# Patient Record
Sex: FEMALE | Race: ASIAN | NOT HISPANIC OR LATINO | URBAN - METROPOLITAN AREA
[De-identification: names, ages, dates, MRNs, and addresses within clinical notes are randomized per-mention and may not be internally consistent; named-entity substitution may affect disease eponyms.]

---

## 2023-08-22 ENCOUNTER — INPATIENT (INPATIENT)
Facility: HOSPITAL | Age: 68
LOS: 1 days | Discharge: HOME | DRG: 86 | End: 2023-08-24
Attending: SURGERY | Admitting: SURGERY
Payer: MEDICAID

## 2023-08-22 ENCOUNTER — EMERGENCY (EMERGENCY)
Facility: HOSPITAL | Age: 68
LOS: 1 days | Discharge: ANOTHER HOSPITAL | End: 2023-08-22
Attending: EMERGENCY MEDICINE
Payer: SELF-PAY

## 2023-08-22 VITALS
HEIGHT: 61 IN | RESPIRATION RATE: 20 BRPM | WEIGHT: 141.98 LBS | HEART RATE: 73 BPM | OXYGEN SATURATION: 98 % | DIASTOLIC BLOOD PRESSURE: 101 MMHG | SYSTOLIC BLOOD PRESSURE: 165 MMHG | TEMPERATURE: 98 F

## 2023-08-22 VITALS
RESPIRATION RATE: 20 BRPM | SYSTOLIC BLOOD PRESSURE: 142 MMHG | DIASTOLIC BLOOD PRESSURE: 84 MMHG | WEIGHT: 141.98 LBS | HEART RATE: 71 BPM | HEIGHT: 61 IN | OXYGEN SATURATION: 94 % | TEMPERATURE: 98 F

## 2023-08-22 VITALS
DIASTOLIC BLOOD PRESSURE: 92 MMHG | HEART RATE: 70 BPM | TEMPERATURE: 98 F | RESPIRATION RATE: 18 BRPM | OXYGEN SATURATION: 100 % | SYSTOLIC BLOOD PRESSURE: 139 MMHG

## 2023-08-22 DIAGNOSIS — I60.9 NONTRAUMATIC SUBARACHNOID HEMORRHAGE, UNSPECIFIED: ICD-10-CM

## 2023-08-22 LAB
ALBUMIN SERPL ELPH-MCNC: 3.9 G/DL — SIGNIFICANT CHANGE UP (ref 3.5–5)
ALBUMIN SERPL ELPH-MCNC: 4.7 G/DL — SIGNIFICANT CHANGE UP (ref 3.3–5)
ALP SERPL-CCNC: 74 U/L — SIGNIFICANT CHANGE UP (ref 40–120)
ALP SERPL-CCNC: 74 U/L — SIGNIFICANT CHANGE UP (ref 40–120)
ALT FLD-CCNC: 26 U/L — SIGNIFICANT CHANGE UP (ref 10–45)
ALT FLD-CCNC: 42 U/L DA — SIGNIFICANT CHANGE UP (ref 10–60)
ANION GAP SERPL CALC-SCNC: 12 MMOL/L — SIGNIFICANT CHANGE UP (ref 5–17)
ANION GAP SERPL CALC-SCNC: 6 MMOL/L — SIGNIFICANT CHANGE UP (ref 5–17)
APTT BLD: 20.3 SEC — LOW (ref 24.5–35.6)
APTT BLD: 32.6 SEC — SIGNIFICANT CHANGE UP (ref 24.5–35.6)
APTT BLD: 36.1 SEC — HIGH (ref 24.5–35.6)
AST SERPL-CCNC: 43 U/L — HIGH (ref 10–40)
AST SERPL-CCNC: 52 U/L — HIGH (ref 10–40)
BASOPHILS # BLD AUTO: 0.09 K/UL — SIGNIFICANT CHANGE UP (ref 0–0.2)
BASOPHILS # BLD AUTO: 0.13 K/UL — SIGNIFICANT CHANGE UP (ref 0–0.2)
BASOPHILS NFR BLD AUTO: 0.5 % — SIGNIFICANT CHANGE UP (ref 0–2)
BASOPHILS NFR BLD AUTO: 0.7 % — SIGNIFICANT CHANGE UP (ref 0–2)
BILIRUB SERPL-MCNC: 0.8 MG/DL — SIGNIFICANT CHANGE UP (ref 0.2–1.2)
BILIRUB SERPL-MCNC: 0.9 MG/DL — SIGNIFICANT CHANGE UP (ref 0.2–1.2)
BLD GP AB SCN SERPL QL: NEGATIVE — SIGNIFICANT CHANGE UP
BUN SERPL-MCNC: 27 MG/DL — HIGH (ref 7–23)
BUN SERPL-MCNC: 28 MG/DL — HIGH (ref 7–18)
CALCIUM SERPL-MCNC: 9.4 MG/DL — SIGNIFICANT CHANGE UP (ref 8.4–10.5)
CALCIUM SERPL-MCNC: 9.8 MG/DL — SIGNIFICANT CHANGE UP (ref 8.4–10.5)
CHLORIDE SERPL-SCNC: 104 MMOL/L — SIGNIFICANT CHANGE UP (ref 96–108)
CHLORIDE SERPL-SCNC: 108 MMOL/L — SIGNIFICANT CHANGE UP (ref 96–108)
CO2 SERPL-SCNC: 20 MMOL/L — LOW (ref 22–31)
CO2 SERPL-SCNC: 21 MMOL/L — LOW (ref 22–31)
CREAT SERPL-MCNC: 1.59 MG/DL — HIGH (ref 0.5–1.3)
CREAT SERPL-MCNC: 1.81 MG/DL — HIGH (ref 0.5–1.3)
EGFR: 30 ML/MIN/1.73M2 — LOW
EGFR: 36 ML/MIN/1.73M2 — LOW
EOSINOPHIL # BLD AUTO: 0.09 K/UL — SIGNIFICANT CHANGE UP (ref 0–0.5)
EOSINOPHIL # BLD AUTO: 0.2 K/UL — SIGNIFICANT CHANGE UP (ref 0–0.5)
EOSINOPHIL NFR BLD AUTO: 0.5 % — SIGNIFICANT CHANGE UP (ref 0–6)
EOSINOPHIL NFR BLD AUTO: 1.1 % — SIGNIFICANT CHANGE UP (ref 0–6)
GLUCOSE SERPL-MCNC: 118 MG/DL — HIGH (ref 70–99)
GLUCOSE SERPL-MCNC: 127 MG/DL — HIGH (ref 70–99)
HCT VFR BLD CALC: 28.8 % — LOW (ref 34.5–45)
HCT VFR BLD CALC: 29.1 % — LOW (ref 34.5–45)
HGB BLD-MCNC: 10 G/DL — LOW (ref 11.5–15.5)
HGB BLD-MCNC: 9.9 G/DL — LOW (ref 11.5–15.5)
IMM GRANULOCYTES NFR BLD AUTO: 0.9 % — SIGNIFICANT CHANGE UP (ref 0–0.9)
IMM GRANULOCYTES NFR BLD AUTO: 1 % — HIGH (ref 0–0.9)
INR BLD: 1 RATIO — SIGNIFICANT CHANGE UP (ref 0.85–1.18)
INR BLD: 1.06 RATIO — SIGNIFICANT CHANGE UP (ref 0.85–1.18)
LYMPHOCYTES # BLD AUTO: 1.32 K/UL — SIGNIFICANT CHANGE UP (ref 1–3.3)
LYMPHOCYTES # BLD AUTO: 1.6 K/UL — SIGNIFICANT CHANGE UP (ref 1–3.3)
LYMPHOCYTES # BLD AUTO: 7.6 % — LOW (ref 13–44)
LYMPHOCYTES # BLD AUTO: 8.7 % — LOW (ref 13–44)
MCHC RBC-ENTMCNC: 28.9 PG — SIGNIFICANT CHANGE UP (ref 27–34)
MCHC RBC-ENTMCNC: 29.2 PG — SIGNIFICANT CHANGE UP (ref 27–34)
MCHC RBC-ENTMCNC: 34 GM/DL — SIGNIFICANT CHANGE UP (ref 32–36)
MCHC RBC-ENTMCNC: 34.7 GM/DL — SIGNIFICANT CHANGE UP (ref 32–36)
MCV RBC AUTO: 84.2 FL — SIGNIFICANT CHANGE UP (ref 80–100)
MCV RBC AUTO: 84.8 FL — SIGNIFICANT CHANGE UP (ref 80–100)
MONOCYTES # BLD AUTO: 0.71 K/UL — SIGNIFICANT CHANGE UP (ref 0–0.9)
MONOCYTES # BLD AUTO: 0.72 K/UL — SIGNIFICANT CHANGE UP (ref 0–0.9)
MONOCYTES NFR BLD AUTO: 3.9 % — SIGNIFICANT CHANGE UP (ref 2–14)
MONOCYTES NFR BLD AUTO: 4.1 % — SIGNIFICANT CHANGE UP (ref 2–14)
NEUTROPHILS # BLD AUTO: 15.02 K/UL — HIGH (ref 1.8–7.4)
NEUTROPHILS # BLD AUTO: 15.55 K/UL — HIGH (ref 1.8–7.4)
NEUTROPHILS NFR BLD AUTO: 84.7 % — HIGH (ref 43–77)
NEUTROPHILS NFR BLD AUTO: 86.3 % — HIGH (ref 43–77)
NRBC # BLD: 0 /100 WBCS — SIGNIFICANT CHANGE UP (ref 0–0)
NRBC # BLD: 0 /100 WBCS — SIGNIFICANT CHANGE UP (ref 0–0)
PLATELET # BLD AUTO: 214 K/UL — SIGNIFICANT CHANGE UP (ref 150–400)
PLATELET # BLD AUTO: 221 K/UL — SIGNIFICANT CHANGE UP (ref 150–400)
POTASSIUM SERPL-MCNC: 4 MMOL/L — SIGNIFICANT CHANGE UP (ref 3.5–5.3)
POTASSIUM SERPL-MCNC: 4.3 MMOL/L — SIGNIFICANT CHANGE UP (ref 3.5–5.3)
POTASSIUM SERPL-SCNC: 4 MMOL/L — SIGNIFICANT CHANGE UP (ref 3.5–5.3)
POTASSIUM SERPL-SCNC: 4.3 MMOL/L — SIGNIFICANT CHANGE UP (ref 3.5–5.3)
PROT SERPL-MCNC: 8.3 G/DL — SIGNIFICANT CHANGE UP (ref 6–8.3)
PROT SERPL-MCNC: 8.4 G/DL — HIGH (ref 6–8.3)
PROTHROM AB SERPL-ACNC: 11.1 SEC — SIGNIFICANT CHANGE UP (ref 9.5–13)
PROTHROM AB SERPL-ACNC: 11.4 SEC — SIGNIFICANT CHANGE UP (ref 9.5–13)
RBC # BLD: 3.42 M/UL — LOW (ref 3.8–5.2)
RBC # BLD: 3.43 M/UL — LOW (ref 3.8–5.2)
RBC # FLD: 16.5 % — HIGH (ref 10.3–14.5)
RBC # FLD: 16.6 % — HIGH (ref 10.3–14.5)
RH IG SCN BLD-IMP: POSITIVE — SIGNIFICANT CHANGE UP
SARS-COV-2 RNA SPEC QL NAA+PROBE: SIGNIFICANT CHANGE UP
SODIUM SERPL-SCNC: 135 MMOL/L — SIGNIFICANT CHANGE UP (ref 135–145)
SODIUM SERPL-SCNC: 136 MMOL/L — SIGNIFICANT CHANGE UP (ref 135–145)
WBC # BLD: 17.42 K/UL — HIGH (ref 3.8–10.5)
WBC # BLD: 18.36 K/UL — HIGH (ref 3.8–10.5)
WBC # FLD AUTO: 17.42 K/UL — HIGH (ref 3.8–10.5)
WBC # FLD AUTO: 18.36 K/UL — HIGH (ref 3.8–10.5)

## 2023-08-22 PROCEDURE — 85025 COMPLETE CBC W/AUTO DIFF WBC: CPT

## 2023-08-22 PROCEDURE — 85610 PROTHROMBIN TIME: CPT

## 2023-08-22 PROCEDURE — 70450 CT HEAD/BRAIN W/O DYE: CPT | Mod: MA

## 2023-08-22 PROCEDURE — 86901 BLOOD TYPING SEROLOGIC RH(D): CPT

## 2023-08-22 PROCEDURE — 99291 CRITICAL CARE FIRST HOUR: CPT

## 2023-08-22 PROCEDURE — 72125 CT NECK SPINE W/O DYE: CPT | Mod: MA

## 2023-08-22 PROCEDURE — 70450 CT HEAD/BRAIN W/O DYE: CPT | Mod: 26,MA

## 2023-08-22 PROCEDURE — 99291 CRITICAL CARE FIRST HOUR: CPT | Mod: 25

## 2023-08-22 PROCEDURE — 87635 SARS-COV-2 COVID-19 AMP PRB: CPT

## 2023-08-22 PROCEDURE — 73030 X-RAY EXAM OF SHOULDER: CPT

## 2023-08-22 PROCEDURE — 70486 CT MAXILLOFACIAL W/O DYE: CPT | Mod: MA

## 2023-08-22 PROCEDURE — 70486 CT MAXILLOFACIAL W/O DYE: CPT | Mod: 26,MA

## 2023-08-22 PROCEDURE — 72125 CT NECK SPINE W/O DYE: CPT | Mod: 26,MA

## 2023-08-22 PROCEDURE — 86850 RBC ANTIBODY SCREEN: CPT

## 2023-08-22 PROCEDURE — 73020 X-RAY EXAM OF SHOULDER: CPT | Mod: 26,59,RT

## 2023-08-22 PROCEDURE — 73080 X-RAY EXAM OF ELBOW: CPT | Mod: 26,RT

## 2023-08-22 PROCEDURE — 73060 X-RAY EXAM OF HUMERUS: CPT | Mod: 26,RT

## 2023-08-22 PROCEDURE — 73030 X-RAY EXAM OF SHOULDER: CPT | Mod: 26,RT

## 2023-08-22 PROCEDURE — 71045 X-RAY EXAM CHEST 1 VIEW: CPT | Mod: 26

## 2023-08-22 PROCEDURE — 86900 BLOOD TYPING SEROLOGIC ABO: CPT

## 2023-08-22 PROCEDURE — 85730 THROMBOPLASTIN TIME PARTIAL: CPT

## 2023-08-22 PROCEDURE — 80053 COMPREHEN METABOLIC PANEL: CPT

## 2023-08-22 PROCEDURE — 70450 CT HEAD/BRAIN W/O DYE: CPT | Mod: 26,MA,77

## 2023-08-22 PROCEDURE — 36415 COLL VENOUS BLD VENIPUNCTURE: CPT

## 2023-08-22 RX ORDER — OXYCODONE HYDROCHLORIDE 5 MG/1
5 TABLET ORAL EVERY 4 HOURS
Refills: 0 | Status: DISCONTINUED | OUTPATIENT
Start: 2023-08-22 | End: 2023-08-24

## 2023-08-22 RX ORDER — LEVETIRACETAM 250 MG/1
500 TABLET, FILM COATED ORAL ONCE
Refills: 0 | Status: COMPLETED | OUTPATIENT
Start: 2023-08-22 | End: 2023-08-22

## 2023-08-22 RX ORDER — OXYCODONE HYDROCHLORIDE 5 MG/1
2.5 TABLET ORAL EVERY 4 HOURS
Refills: 0 | Status: DISCONTINUED | OUTPATIENT
Start: 2023-08-22 | End: 2023-08-24

## 2023-08-22 RX ORDER — SODIUM CHLORIDE 9 MG/ML
1000 INJECTION, SOLUTION INTRAVENOUS
Refills: 0 | Status: DISCONTINUED | OUTPATIENT
Start: 2023-08-22 | End: 2023-08-22

## 2023-08-22 RX ORDER — ACETAMINOPHEN 500 MG
975 TABLET ORAL ONCE
Refills: 0 | Status: COMPLETED | OUTPATIENT
Start: 2023-08-22 | End: 2023-08-22

## 2023-08-22 RX ORDER — DESMOPRESSIN ACETATE 0.1 MG/1
20 TABLET ORAL ONCE
Refills: 0 | Status: COMPLETED | OUTPATIENT
Start: 2023-08-22 | End: 2023-08-22

## 2023-08-22 RX ORDER — LABETALOL HCL 100 MG
10 TABLET ORAL ONCE
Refills: 0 | Status: COMPLETED | OUTPATIENT
Start: 2023-08-22 | End: 2023-08-22

## 2023-08-22 RX ORDER — ACETAMINOPHEN 500 MG
975 TABLET ORAL EVERY 6 HOURS
Refills: 0 | Status: DISCONTINUED | OUTPATIENT
Start: 2023-08-22 | End: 2023-08-24

## 2023-08-22 RX ORDER — SODIUM CHLORIDE 9 MG/ML
250 INJECTION INTRAMUSCULAR; INTRAVENOUS; SUBCUTANEOUS ONCE
Refills: 0 | Status: COMPLETED | OUTPATIENT
Start: 2023-08-22 | End: 2023-08-22

## 2023-08-22 RX ORDER — LEVETIRACETAM 250 MG/1
500 TABLET, FILM COATED ORAL
Refills: 0 | Status: DISCONTINUED | OUTPATIENT
Start: 2023-08-23 | End: 2023-08-24

## 2023-08-22 RX ADMIN — DESMOPRESSIN ACETATE 20 MICROGRAM(S): 0.1 TABLET ORAL at 20:15

## 2023-08-22 RX ADMIN — DESMOPRESSIN ACETATE 220 MICROGRAM(S): 0.1 TABLET ORAL at 19:59

## 2023-08-22 RX ADMIN — Medication 10 MILLIGRAM(S): at 16:32

## 2023-08-22 RX ADMIN — LEVETIRACETAM 500 MILLIGRAM(S): 250 TABLET, FILM COATED ORAL at 20:15

## 2023-08-22 RX ADMIN — SODIUM CHLORIDE 250 MILLILITER(S): 9 INJECTION INTRAMUSCULAR; INTRAVENOUS; SUBCUTANEOUS at 19:59

## 2023-08-22 RX ADMIN — Medication 975 MILLIGRAM(S): at 14:58

## 2023-08-22 RX ADMIN — SODIUM CHLORIDE 250 MILLILITER(S): 9 INJECTION INTRAMUSCULAR; INTRAVENOUS; SUBCUTANEOUS at 21:00

## 2023-08-22 RX ADMIN — LEVETIRACETAM 400 MILLIGRAM(S): 250 TABLET, FILM COATED ORAL at 19:59

## 2023-08-22 NOTE — ED PROVIDER NOTE - CLINICAL SUMMARY MEDICAL DECISION MAKING FREE TEXT BOX
67-year-old female with history of hypertension presenting with fall.  Patient reports she was trying to catch the bus, tripped and hit her head.  No loss of consciousness.  Transfer from [place] for brain bleed.  CT showed bilateral subdural and subarachnoid hemorrhage.  Right orbital floor blowout fracture with concern for entrapment.  Also with right proximal humerus fracture.  Patient reports right shoulder pain, no headache, neck pain, chest pain, shortness of breath, abdominal pain, nausea, vomiting. Does not take any meds for her htn. Takes ASA twice a week, last took yesterday. Exam shows R eyelid swelling, R shoulder pain. neurovascularly intact. Will get rpt CTH, consult nsgy, trauma, ortho, facial, ophtho.

## 2023-08-22 NOTE — H&P ADULT - NSHPPHYSICALEXAM_GEN_ALL_CORE
Physical Exam:  General: NAD, Lying in bed comfortably  Neuro: Alert and answering questions appropriately, A&Ox3, GCS 15  HEENT: R eye edematous and shut w/ associated ecchymosis  Cardio: No peripheral edema, regular rate  Resp: Good effort, no signs of respiratory distress, on RA  GI/Abd: Soft, nontender, nondistended no rebound/guarding, no masses palpated  Vascular: All 4 extremities warm  Musculoskeletal: All 4 extremities moving spontaneously, no limitations  Extremities: R shoulder tenderness, small ecchymosis of b/l knees

## 2023-08-22 NOTE — CONSULT NOTE ADULT - ASSESSMENT
ASSESSMENT & PLAN  67yFemale w/ R proximal humerus fx s/p immobilization.    -NWB RUE in a sling  -pain control  -ice/cold compress  -no acute ortho surgery at this time  -can f/u outpatient in Dr. Tomas's office in 1-2 weeks after dc, call office to make appointment  -ortho to sign off, please reach out with any additional questions    Evan Mancera MD  Orthopaedic Surgery Resident    For all questions, please reach out via the following numbers for the on-call resident; do not reach out via Teams.  [facility] [id number]  [facility]        [id number]  [facility]  [id number]/ [phone]

## 2023-08-22 NOTE — H&P ADULT - NSHPLABSRESULTS_GEN_ALL_CORE
LABS:                          10.0   17.42 )-----------( 214      ( 22 Aug 2023 19:32 )             28.8       08-22    136  |  104  |  27<H>  ----------------------------<  118<H>  4.0   |  20<L>  |  1.59<H>    Ca    9.8      22 Aug 2023 19:32    TPro  8.4<H>  /  Alb  4.7  /  TBili  0.9  /  DBili  x   /  AST  43<H>  /  ALT  26  /  AlkPhos  74  08-22              Urinalysis Basic - ( 22 Aug 2023 19:32 )    Color: x / Appearance: x / SG: x / pH: x  Gluc: 118 mg/dL / Ketone: x  / Bili: x / Urobili: x   Blood: x / Protein: x / Nitrite: x   Leuk Esterase: x / RBC: x / WBC x   Sq Epi: x / Non Sq Epi: x / Bacteria: x        PT/INR - ( 22 Aug 2023 20:47 )   PT: 11.1 sec;   INR: 1.06 ratio         PTT - ( 22 Aug 2023 20:47 )  PTT:20.3 sec      _______________________________________  RADIOLOGY & ADDITIONAL STUDIES:  < from: CT Head No Cont (08.22.23 @ 19:36) >    FINDINGS:    Subarachnoid hemorrhage within the sulci of the cerebral vertex and and   in right sylvian fissure, not significantly changed. No brain parenchymal   or subarachnoid space hemorrhages.There is no mass effect or parenchymal   edema. Gray-white differentiation is preserved. No cerebral cortical   lesion is found.    No hydrocephalus is present. Ventricles and sulci are normal in size for   the patient's age. Basalcisterns are patent.    Facial fractures involving the right anterolateral maxillary walls and   orbital floor with hemorrhagic material opacifying the right maxillary   sinus.  Extensive right periorbital edema with subcutaneous emphysema   extends into the right malar and infratemporal fossa soft tissues.  Foci   of air is seen in the inferior nasal right orbit. The extraocular muscles   appear intact. The globes are intact.  Mild right anterior ethmoid air   cell and left maxillary sinus mucosal thickening. The remaining   visualized paranasal sinuses and mastoid air cells are clear.    No calvarial fracture is identified. Persistence of the suture between   the frontal bones is an incidental developmental variant.      IMPRESSION:    1. Subarachnoid space hemorrhage    2. RIGHT maxillary and orbital floor fractures. Intraorbital emphysema.    Extensive right periorbital edema and subcutaneous emphysema. No orbital   fluid collection. Hemorrhagic fluid in the right maxillary sinus    3. Findings appear comparable to the outside prior CT earlier same date

## 2023-08-22 NOTE — ED ADULT NURSE NOTE - ED STAT RN HANDOFF DETAILS
Pt awake alert oriented x3. no acute respiratory distress noted. transferred to [facility] for continuation of care.

## 2023-08-22 NOTE — ED PROVIDER NOTE - ATTENDING CONTRIBUTION TO CARE
Can schedule clinic   RGUJRAL 67-year-old female history of hypertension not on any medications status post mechanical fall today was brought to [facility] and had CT head C-spine maxillofacial found to have b/l subdural, subarachnoid, orbital fracture, and right humeral neck fracture.  Patient is GCS 15 no posterior midline cervical thoracic lumbar tender to palpation no chest wall tenderness.  Patient is in a sling and right upper extremity neurovascularly intact.  Patient is neuro intact, cta b/l, +s1s2, abdomen soft nontender.  Last CT at 3 PM.  Patient given labetalol for hypertension at [facility].  Will repeat CT head now.  Case discussed with neurosurgery to administer DDAVP for last use of aspirin 81 mg on Sunday.  Keppra at this time.  OMFS and surgery consulted.

## 2023-08-22 NOTE — ED PROVIDER NOTE - PROGRESS NOTE DETAILS
Eva Boothe- trauma, nsgy, ophtho, plastics, ortho consulted. ortho recs CT, XR. plastics rec non-op management. Eva Boothe- will admit to Dr. Wills.

## 2023-08-22 NOTE — CONSULT NOTE ADULT - SUBJECTIVE AND OBJECTIVE BOX
.Observation Brochure and Video  Observation status based on provider's order. Observation Brochure was given and video watched.  Tricia Dalton RN     [facility] DEPARTMENT OF OPHTHALMOLOGY - INITIAL ADULT CONSULT  -----------------------------------------------------------------------------  Jose Park MD, PGY-2  Contact: TEAMS  -----------------------------------------------------------------------------    HPI:  67-year-old female with history of hypertension presenting with fall.  Patient reports she was trying to catch the bus, tripped and hit her head.  No loss of consciousness.  Transfer from [place] for brain bleed.  CT showed bilateral subdural and subarachnoid hemorrhage.  Right orbital floor blowout fracture with concern for entrapment.  Also with right proximal humerus fracture.  Patient reports right shoulder pain, no headache, neck pain, chest pain, shortness of breath, abdominal pain, nausea, vomiting. Does not take any meds for her htn. Takes ASA twice a week, last took yesterday.    Interval History: Ophthalmology consulted for CT findings with right maxillary and inferior orbital floor fractures. Patient denies any eye pain, pain with movement, or decreased vision.     PMH: HTN   POcHx: denies surg/laser  FH: denies glc/amd  Social History: denies etoh/tobacco  Ophthalmic Medications: none  Allergies: NKDA    Review of Systems:  Constitutional: No fever, chills  Eyes: No blurry vision, flashes, floaters, FBS, erythema, discharge, double vision, OU  Neuro: No tremors  Cardiovascular: No chest pain, palpitations  Respiratory: No SOB, no cough  GI: No nausea, vomiting, abdominal pain  : No dysuria  Skin: no rash  Psych: no depression  Endocrine: no polyuria, polydipsia  Heme/lymph: no swelling    VITALS: T(C): 36.9 (08-22-23 @ 19:10)  T(F): 98.4 (08-22-23 @ 19:10), Max: 98.4 (08-22-23 @ 19:10)  HR: 70 (08-22-23 @ 20:45) (70 - 74)  BP: 116/74 (08-22-23 @ 20:45) (116/74 - 183/97)  RR:  (16 - 20)  SpO2:  (94% - 99%)  Wt(kg): --  General: AAO x 3, appropriate mood and affect    Ophthalmology Exam:  Visual acuity (cc): 20/40 ph 20/30 -1 OD, 20/30 OS phni   Pupils: PERRL OU, no APD  Ttono: 23 OD (squeezing), 20 OS   Extraocular movements (EOMs): Full OU, no pain, no diplopia  Confrontational Visual Field (CVF): Full OU  Color Plates: 12/12 OU    Pen Light Exam (PLE)  External: significant right periorbital swelling and ecchymosis most prominent inferiorly, flat OS  Lids/Lashes/Lacrimal Ducts: upper and lower lid swelling OD, flat OS  Sclera/Conjunctiva: conjunctival chemosis most prominent temporally OD, white and quiet OS  Cornea: Cl OU  Anterior Chamber: D+F OU    Iris: Flat OU  Lens: NS OU     Fundus Exam: dilated with 1% tropicamide and 2.5% phenylephrine  Approval obtained from primary team for dilation  Patient aware that pupils can remained dilated for at least 4-6 hours  Exam performed with 20D lens    Vitreous: wnl OU  Disc, cup/disc: sharp and pink, 0.4 OU  Macula: wnl OU  Vessels: wnl OU  Periphery: wnl OU    Labs/Imaging:    INTERPRETATION:  CT head without intravenous contrast    CLINICAL INDICATION: Trauma code. Transfer from outside hospital.   Four-hour follow-up scan.    COMPARISON: Study compared to CT head from outside hospital from earlier   same day at 3:13 PM under separate medical record number: [id number]    TECHNIQUE: Axial noncontrast CT images of the head were obtained.   Sagittal and coronal reformatted images were provided. Bone and soft   tissue windows were evaluated.    FINDINGS:    Subarachnoid hemorrhage within the sulci of the cerebral vertex and and   in right sylvian fissure, not significantly changed. No brain parenchymal   or subarachnoid space hemorrhages.There is no mass effect or parenchymal   edema. Gray-white differentiation is preserved. No cerebral cortical   lesion is found.    No hydrocephalus is present. Ventricles and sulci are normal in size for   the patient's age. Basalcisterns are patent.    Facial fractures involving the right anterolateral maxillary walls and   orbital floor with hemorrhagic material opacifying the right maxillary   sinus.  Extensive right periorbital edema with subcutaneous emphysema   extends into the right malar and infratemporal fossa soft tissues.  Foci   of air is seen in the inferior nasal right orbit. The extraocular muscles   appear intact. The globes are intact.  Mild right anterior ethmoid air   cell and left maxillary sinus mucosal thickening. The remaining   visualized paranasal sinuses and mastoid air cells are clear.    No calvarial fracture is identified. Persistence of the suture between   the frontal bones is an incidental developmental variant.      IMPRESSION:    1. Subarachnoid space hemorrhage    2. RIGHT maxillary and orbital floor fractures. Intraorbital emphysema.    Extensive right periorbital edema and subcutaneous emphysema. No orbital   fluid collection. Hemorrhagic fluid in the right maxillary sinus    3. Findings appear comparable to the outside prior CT earlier same date      These findings were discussed with Dr. Peralta by Dr. Ro at 8:04 PM on   8/22/2023, with repeat back confirmation.    --- End of Report ---   [facility] DEPARTMENT OF OPHTHALMOLOGY - INITIAL ADULT CONSULT  -----------------------------------------------------------------------------  Jose Park MD, PGY-2  Contact: TEAMS  -----------------------------------------------------------------------------    HPI:  67-year-old female with history of hypertension presenting with fall.  Patient reports she was trying to catch the bus, tripped and hit her head.  No loss of consciousness.  Transfer from [place] for brain bleed.  CT showed bilateral subdural and subarachnoid hemorrhage.  Right orbital floor blowout fracture with concern for entrapment.  Also with right proximal humerus fracture.  Patient reports right shoulder pain, no headache, neck pain, chest pain, shortness of breath, abdominal pain, nausea, vomiting. Does not take any meds for her htn. Takes ASA twice a week, last took yesterday.    Interval History: Ophthalmology consulted for CT findings with right maxillary and inferior orbital floor fractures. Patient denies any eye pain, pain with movement, or decreased vision.     PMH: HTN   POcHx: denies surg/laser  FH: denies glc/amd  Social History: denies etoh/tobacco  Ophthalmic Medications: none  Allergies: NKDA    Review of Systems:  Constitutional: No fever, chills  Eyes: No blurry vision, flashes, floaters, FBS, erythema, discharge, double vision, OU  Neuro: No tremors  Cardiovascular: No chest pain, palpitations  Respiratory: No SOB, no cough  GI: No nausea, vomiting, abdominal pain  : No dysuria  Skin: no rash  Psych: no depression  Endocrine: no polyuria, polydipsia  Heme/lymph: no swelling    VITALS: T(C): 36.9 (08-22-23 @ 19:10)  T(F): 98.4 (08-22-23 @ 19:10), Max: 98.4 (08-22-23 @ 19:10)  HR: 70 (08-22-23 @ 20:45) (70 - 74)  BP: 116/74 (08-22-23 @ 20:45) (116/74 - 183/97)  RR:  (16 - 20)  SpO2:  (94% - 99%)  Wt(kg): --  General: AAO x 3, appropriate mood and affect    Ophthalmology Exam:  Visual acuity (cc): 20/40 ph 20/30 -1 OD, 20/30 OS phni   Pupils: PERRL OU, no APD  Ttono: 23 OD (squeezing), 20 OS   Extraocular movements (EOMs): Full OU, no pain, no diplopia  Confrontational Visual Field (CVF): Full OU  Color Plates: 12/12 OU    Pen Light Exam (PLE)  External: significant right periorbital swelling and ecchymosis most prominent inferiorly, flat OS  Lids/Lashes/Lacrimal Ducts: upper and lower lid swelling OD, flat OS  Sclera/Conjunctiva: conjunctival chemosis most prominent temporally OD, white and quiet OS  Cornea: Cl OU  Anterior Chamber: D+F OU    Iris: Flat OU  Lens: NS OU     Fundus Exam: dilated with 1% tropicamide and 2.5% phenylephrine  Approval obtained from primary team for dilation  Patient aware that pupils can remained dilated for at least 4-6 hours  Exam performed with 20D lens    Vitreous: wnl OU  Disc, cup/disc: sharp and pink, 0.4 OD, .5 OS   Macula: wnl OU  Vessels: wnl OU  Periphery: wnl OU    Labs/Imaging:    INTERPRETATION:  CT head without intravenous contrast    CLINICAL INDICATION: Trauma code. Transfer from outside hospital.   Four-hour follow-up scan.    COMPARISON: Study compared to CT head from outside hospital from earlier   same day at 3:13 PM under separate medical record number: [id number]    TECHNIQUE: Axial noncontrast CT images of the head were obtained.   Sagittal and coronal reformatted images were provided. Bone and soft   tissue windows were evaluated.    FINDINGS:    Subarachnoid hemorrhage within the sulci of the cerebral vertex and and   in right sylvian fissure, not significantly changed. No brain parenchymal   or subarachnoid space hemorrhages.There is no mass effect or parenchymal   edema. Gray-white differentiation is preserved. No cerebral cortical   lesion is found.    No hydrocephalus is present. Ventricles and sulci are normal in size for   the patient's age. Basalcisterns are patent.    Facial fractures involving the right anterolateral maxillary walls and   orbital floor with hemorrhagic material opacifying the right maxillary   sinus.  Extensive right periorbital edema with subcutaneous emphysema   extends into the right malar and infratemporal fossa soft tissues.  Foci   of air is seen in the inferior nasal right orbit. The extraocular muscles   appear intact. The globes are intact.  Mild right anterior ethmoid air   cell and left maxillary sinus mucosal thickening. The remaining   visualized paranasal sinuses and mastoid air cells are clear.    No calvarial fracture is identified. Persistence of the suture between   the frontal bones is an incidental developmental variant.      IMPRESSION:    1. Subarachnoid space hemorrhage    2. RIGHT maxillary and orbital floor fractures. Intraorbital emphysema.    Extensive right periorbital edema and subcutaneous emphysema. No orbital   fluid collection. Hemorrhagic fluid in the right maxillary sinus    3. Findings appear comparable to the outside prior CT earlier same date      These findings were discussed with Dr. Peralta by Dr. Ro at 8:04 PM on   8/22/2023, with repeat back confirmation.    --- End of Report ---

## 2023-08-22 NOTE — H&P ADULT - HISTORY OF PRESENT ILLNESS
68y/o F w/ no significant PMHx/PSHx but takes ASA81 twice a week (last taken yesterday) who presents as trauma transfer from [facility] s/p mechanical fall with SDH, SAH, R humeral head fx and R orbital blowout fx. Patient reports that she was crossing the street and saw the bus coming so walked faster and tripped and fell on her right side. +HS, no LOC. Patient reports R shoulder pain and R eye pain. Denies abdominal pain, nausea, vomiting, fevers, chills, chest pain, SOB.    In the ED, patient is afebrile, hypertensive to 183/97. Labs significant for WBC 17.42, Cr 1.59. CTH w/ findings of SAH, R maxillary and orbital floor fx.

## 2023-08-22 NOTE — H&P ADULT - ASSESSMENT
66y/o F w/ no significant PMHx/PSHx but takes ASA81 twice a week (last taken yesterday) who presents as trauma transfer from [facility] s/p mechanical fall with SDH, SAH, R humeral head fx and R orbital blowout fx.    PLAN:  -Admit to ACS, Dr. Georgie Wills  -NPO/IVF  -NSGY following - s/p DDAVP  -CTH in AM  -No AC/AP  -Keppra 500 BID x7days  -Optho following - no intervention, sinus precautions  -Plastics/face consulted - f/u recs  -Ortho consulted - f/u recs  -Pain control  -AM labs      Patient discussed with ACS attending, Dr. Wills.    Lin Sharma, PGY-2  ACS  [id number] 66y/o F w/ no significant PMHx/PSHx but takes ASA81 twice a week (last taken yesterday) [facility] with SDH, SAH, R humeral head fx and R orbital blowout fx.    PLAN:  -Admit to ACS, Dr. Georgie Wills  -Regular diet  -NSGY following - s/p DDAVP  -CTH in AM  -No AC/AP  -Keppra 500 BID x7days  -Optho following - no intervention, sinus precautions  -Plastics/face consulted - f/u recs  -Ortho c/s - nonop, f/u OP w/ Dr. Tomas  -Pain control  -AM labs      Patient discussed with ACS attending, Dr. Wills.    Lin Sharma, PGY-2  ACS  [id number] 66y/o F w/ no significant PMHx/PSHx but takes ASA81 twice a week (last taken yesterday) who presents as trauma transfer from [facility] s/p mechanical fall with SDH, SAH, R humeral head fx and R orbital blowout fx.    PLAN:  -Admit to ACS, Dr. Georgie Wills  -Regular diet  -NSGY following - s/p DDAVP  -CTH in AM  -No AC/AP  -Keppra 500 BID x7days  -Optho following - no intervention, sinus precautions  -Plastics/face consulted - f/u recs  -Ortho c/s - nonop, f/u OP w/ Dr. Tomas  -Pain control  -Tertiary in AM  -AM labs      Patient discussed with ACS attending, Dr. Wills.    Lin Sharma, PGY-2  ACS  [id number]

## 2023-08-22 NOTE — ED PROVIDER NOTE - NSICDXPASTMEDICALHX_GEN_ALL_CORE_FT
PAST MEDICAL HISTORY:  No pertinent past medical history
Clear bilaterally, pupils equal, round and reactive to light.

## 2023-08-22 NOTE — ED PROVIDER NOTE - CARE PLAN
Principal Discharge DX:	Intracranial bleeding  Secondary Diagnosis:	Orbital floor fracture  Secondary Diagnosis:	Humeral fracture   1

## 2023-08-22 NOTE — ED PROVIDER NOTE - PHYSICAL EXAMINATION
General appearance: NAD, conversant, afebrile    Eyes: anicteric sclerae, IDRIS, EOMI   HENT: R eyelid swelling; oropharynx clear, MMM and no ulcerations, no pharyngeal erythema or exudate   Neck: Trachea midline; Full range of motion, supple   Pulm: CTA bl, normal respiratory effort and no intercostal retractions, normal work of breathing   CV: RRR, No murmurs, rubs, or gallops. 2+ peripheral pulses.   Abdomen: Soft, non-tender, non-distended; no guarding or rebound   Extremities: R shoulder ttp. pulses, distal motor, sensation intact.    Skin: Dry, normal temperature, turgor and texture; no rash, ulcers or subcutaneous nodules   Psych: Appropriate affect, cooperative; alert and oriented to person, place and time

## 2023-08-22 NOTE — ED PROVIDER NOTE - OBJECTIVE STATEMENT
Walk in
67-year-old female with history of hypertension presenting with fall.  Patient reports she was trying to catch the bus, tripped and hit her head.  No loss of consciousness.  Transfer from [place] for brain bleed.  CT showed bilateral subdural and subarachnoid hemorrhage.  Right orbital floor blowout fracture with concern for entrapment.  Also with right proximal humerus fracture.  Patient reports right shoulder pain, no headache, neck pain, chest pain, shortness of breath, abdominal pain, nausea, vomiting. Does not take any meds for her htn. Takes ASA twice a week, last took yesterday.

## 2023-08-22 NOTE — ED PROVIDER NOTE - OBJECTIVE STATEMENT
67 year old female with no PMHx is presenting for mechanical trip and fall when walking on the street and hitting her face. Patient is presenting with nasal bleed, facial swelling, and right shoulder pain. Pt is not on blood thinner.

## 2023-08-22 NOTE — ED PROVIDER NOTE - PROGRESS NOTE DETAILS
intracranial bleeding, with shift, awake alert GCS 15, humeral fx, orbital floor fx. transfer to [place]. Patient states she takes aspirin occasionally, last dose 2 days ago.

## 2023-08-22 NOTE — ED ADULT NURSE NOTE - OBJECTIVE STATEMENT
Pt presents to the ED s/p falling forward on sidewalk while running for bus. Pt reports tripping on shoes. Pt denies hitting head. Pt denies LOC. Right eye swelling noted. Pt has a right arm sling that was put on my EMS.

## 2023-08-22 NOTE — ED PROVIDER NOTE - PHYSICAL EXAMINATION
Right periorbital and right maxilla ecchymosis.   Dry blood bilaterally to nares.  No active bleeding. No oral laceration.  Right anterior shoulder tenderness to palpation. No tenderness to palpation of rest of arms, legs, chest, or back.   Pt is awake, alert, and oriented x3.

## 2023-08-22 NOTE — CONSULT NOTE ADULT - SUBJECTIVE AND OBJECTIVE BOX
HPI  67yFemale R-hand dominant who denies PMH c/o R shoulder pain s/p mechanical fall. Patient was transferred from [place] after a  when she saw a bus and tried to sprint but tripped and fell on her head and RUE. Denies LOC. Denies numbness/tingling in the RUE. Denies any other trauma/injuries at this time.    ROS  Negative unless otherwise specified in HPI.    PAST MEDICAL & SURGICAL Hx  PAST MEDICAL & SURGICAL HISTORY:  No pertinent past medical history      No significant past surgical history          MEDICATIONS  Home Medications:      ALLERGIES  Allergy Status Unknown      FAMILY Hx  FAMILY HISTORY:      SOCIAL Hx  Social History:  Nonsmoker, no EtOH use (22 Aug 2023 21:48)      VITALS  Vital Signs Last 24 Hrs  T(C): 36.9 (22 Aug 2023 19:10), Max: 36.9 (22 Aug 2023 19:10)  T(F): 98.4 (22 Aug 2023 19:10), Max: 98.4 (22 Aug 2023 19:10)  HR: 70 (22 Aug 2023 20:45) (70 - 74)  BP: 116/74 (22 Aug 2023 20:45) (116/74 - 183/97)  BP(mean): 115 (22 Aug 2023 19:10) (115 - 115)  RR: 16 (22 Aug 2023 20:45) (16 - 20)  SpO2: 99% (22 Aug 2023 20:45) (94% - 99%)    Parameters below as of 22 Aug 2023 20:45  Patient On (Oxygen Delivery Method): room air        PHYSICAL EXAM  Gen: Lying in bed, NAD  Resp: No increased WOB  RUE:  Skin intact, +edema and +ecchymosis over shoulder  +TTP over shoulder, no TTP along remainder of extremity; compartments soft  Limited ROM at shoulder 2/2 pain  Motor: Ax/Musc/Med/Rad/AIN/PIN/U intact  Sensory: Ax/Musc/Med/Rad/U SILT  +Rad pulse, WWP    Secondary survey:  No TTP along spine or other extremities, pelvis grossly stable, SILT and compartments soft throughout    LABS                        10.0   17.42 )-----------( 214      ( 22 Aug 2023 19:32 )             28.8     08-22    136  |  104  |  27<H>  ----------------------------<  118<H>  4.0   |  20<L>  |  1.59<H>    Ca    9.8      22 Aug 2023 19:32    TPro  8.4<H>  /  Alb  4.7  /  TBili  0.9  /  DBili  x   /  AST  43<H>  /  ALT  26  /  AlkPhos  74  08-22    PT/INR - ( 22 Aug 2023 20:47 )   PT: 11.1 sec;   INR: 1.06 ratio         PTT - ( 22 Aug 2023 20:47 )  PTT:20.3 sec    IMAGING  XRs: R proximal humerus fx without displacement of greater troch or surgical neck (personal read)

## 2023-08-22 NOTE — CONSULT NOTE ADULT - ASSESSMENT
67F, takes ASA81 twice a wk for ppx (last taken Sunday), no major pmh, s/p mechanical fall today, found to have small acute SDH, and right maxillary and orbital floot fracture.     # maxillary and inferior orbital floor fracture, right  -CT showing maxillary and orbital floor fractures. Intraorbital emphysema.    Extensive right periorbital edema and subcutaneous emphysema. No orbital   fluid collection. Hemorrhagic fluid in the right maxillary sinus  -EOM full with no diplopia or pain with movement OD   -No entrapment symptoms i.e nausea/vomiting, bradycardia.  -On exam, patient with periorbital swelling and chemosis most prominent temporally. Posterior segment exam unremarkable.    -No indication for surgical intervention from ophthalmic standpoint  -OMFS/facial plastics evaluation before discharge  -Ice packs to left eye q1h  -Pt was instructed to avoid nose blowing, straining, sneezing, no straws. Head of bed elevation at 30-degrees when at rest.    AN AMARIA Del Rosario     Outpatient follow-up: Patient should follow-up with his/her ophthalmologist or with [facility] Department of Ophthalmology upon discharge at the address below     [facility] Department of Ophthalmology  [address] [place] [address]. [address]  [place] [address]  [phone]   67F, takes ASA81 twice a wk for ppx (last taken Sunday), no major pmh, s/p mechanical fall today, found to have small acute SDH, and right maxillary and orbital floot fracture.     # maxillary and inferior orbital floor fracture, right  -CT showing maxillary and orbital floor fractures. Intraorbital emphysema.    Extensive right periorbital edema and subcutaneous emphysema. No orbital   fluid collection. Hemorrhagic fluid in the right maxillary sinus  -EOM full with no diplopia or pain with movement OD   -No entrapment symptoms i.e nausea/vomiting, bradycardia.  -On exam, patient with periorbital swelling and chemosis most prominent temporally. Posterior segment exam unremarkable.    -No indication for surgical intervention from ophthalmic standpoint  -OMFS/facial plastics evaluation before discharge  -Ice packs to left eye q1h  -Pt was instructed to avoid nose blowing, straining, sneezing, no straws. Head of bed elevation at 30-degrees when at rest.    #Glaucoma suspect  - no prior history, no family history   - IOP 23, 20 today   - CDR asymmetry left greater than right   - recommend outpatient workup with further diagnostic testing and evaluation following discharge     ANA MARIA Del Rosario     Outpatient follow-up: Patient should follow-up with his/her ophthalmologist or with [facility] Department of Ophthalmology upon discharge at the address below     [facility] Department of Ophthalmology  [address] [place] [address]. [address]  [place] [address]  [phone]

## 2023-08-22 NOTE — CONSULT NOTE ADULT - SUBJECTIVE AND OBJECTIVE BOX
Ginny Meraz   67F, takes ASA81 twice a wk for ppx (last taken Sunday), no major pmh, s/p mechanical fall today, now with small acute SDH and L frontal contusion w/ tSAH. 4hr repeat CT stable. Also has acute R orbital blowout fx and R humeral fx. PLTs/Coags wnl.     --Anticoagulation--    T(C): 36.9 (08-22-23 @ 19:10), Max: 36.9 (08-22-23 @ 19:10)  HR: 74 (08-22-23 @ 19:10) (71 - 74)  BP: 183/97 (08-22-23 @ 19:10) (142/84 - 183/97)  RR: 16 (08-22-23 @ 19:10) (16 - 20)  SpO2: 99% (08-22-23 @ 19:10) (94% - 99%)  Wt(kg): --    Exam:  R van-orbital edema, o/w intact (RUE pain limited due to humeral fx)

## 2023-08-22 NOTE — ED ADULT NURSE NOTE - NSFALLRISKINTERV_ED_ALL_ED

## 2023-08-22 NOTE — CONSULT NOTE ADULT - ASSESSMENT
Ginny Meraz   67F, takes ASA81 twice a wk for ppx (last taken Sunday), no major pmh, s/p mechanical fall today, now with small acute SDH and L frontal contusion w/ tSAH. 4hr repeat CT stable. Also has acute R orbital blowout fx and R humeral fx. PLTs/Coags wnl. Exam: R van-orbital edema, o/w intact (RUE pain limited due to humeral fx)    -no acute neurosurgical intervention  -trauma/OMFS/Optho evals  -DDAVP  -Repeat CT in AM  -Keppra 500 BID x7 days  -no AC/AP

## 2023-08-23 ENCOUNTER — TRANSCRIPTION ENCOUNTER (OUTPATIENT)
Age: 68
End: 2023-08-23

## 2023-08-23 LAB
ANION GAP SERPL CALC-SCNC: 13 MMOL/L — SIGNIFICANT CHANGE UP (ref 5–17)
BUN SERPL-MCNC: 29 MG/DL — HIGH (ref 7–23)
CALCIUM SERPL-MCNC: 9.1 MG/DL — SIGNIFICANT CHANGE UP (ref 8.4–10.5)
CHLORIDE SERPL-SCNC: 106 MMOL/L — SIGNIFICANT CHANGE UP (ref 96–108)
CO2 SERPL-SCNC: 19 MMOL/L — LOW (ref 22–31)
CREAT SERPL-MCNC: 1.64 MG/DL — HIGH (ref 0.5–1.3)
EGFR: 34 ML/MIN/1.73M2 — LOW
GLUCOSE SERPL-MCNC: 114 MG/DL — HIGH (ref 70–99)
HCT VFR BLD CALC: 25.2 % — LOW (ref 34.5–45)
HCV AB S/CO SERPL IA: 0.1 S/CO — SIGNIFICANT CHANGE UP (ref 0–0.99)
HCV AB SERPL-IMP: SIGNIFICANT CHANGE UP
HGB BLD-MCNC: 8.4 G/DL — LOW (ref 11.5–15.5)
MAGNESIUM SERPL-MCNC: 1.9 MG/DL — SIGNIFICANT CHANGE UP (ref 1.6–2.6)
MCHC RBC-ENTMCNC: 28.4 PG — SIGNIFICANT CHANGE UP (ref 27–34)
MCHC RBC-ENTMCNC: 33.3 GM/DL — SIGNIFICANT CHANGE UP (ref 32–36)
MCV RBC AUTO: 85.1 FL — SIGNIFICANT CHANGE UP (ref 80–100)
NRBC # BLD: 0 /100 WBCS — SIGNIFICANT CHANGE UP (ref 0–0)
PHOSPHATE SERPL-MCNC: 3.5 MG/DL — SIGNIFICANT CHANGE UP (ref 2.5–4.5)
PLATELET # BLD AUTO: 183 K/UL — SIGNIFICANT CHANGE UP (ref 150–400)
POTASSIUM SERPL-MCNC: 3.8 MMOL/L — SIGNIFICANT CHANGE UP (ref 3.5–5.3)
POTASSIUM SERPL-SCNC: 3.8 MMOL/L — SIGNIFICANT CHANGE UP (ref 3.5–5.3)
RBC # BLD: 2.96 M/UL — LOW (ref 3.8–5.2)
RBC # FLD: 16.5 % — HIGH (ref 10.3–14.5)
SODIUM SERPL-SCNC: 138 MMOL/L — SIGNIFICANT CHANGE UP (ref 135–145)
WBC # BLD: 12.17 K/UL — HIGH (ref 3.8–10.5)
WBC # FLD AUTO: 12.17 K/UL — HIGH (ref 3.8–10.5)

## 2023-08-23 PROCEDURE — 70450 CT HEAD/BRAIN W/O DYE: CPT | Mod: 26

## 2023-08-23 PROCEDURE — 99233 SBSQ HOSP IP/OBS HIGH 50: CPT | Mod: 57

## 2023-08-23 PROCEDURE — 99232 SBSQ HOSP IP/OBS MODERATE 35: CPT | Mod: GC

## 2023-08-23 PROCEDURE — 23600 CLTX PROX HUMRL FX W/O MNPJ: CPT | Mod: RT

## 2023-08-23 RX ORDER — BACITRACIN ZINC 500 UNIT/G
1 OINTMENT IN PACKET (EA) TOPICAL THREE TIMES A DAY
Refills: 0 | Status: DISCONTINUED | OUTPATIENT
Start: 2023-08-23 | End: 2023-08-24

## 2023-08-23 RX ORDER — ENOXAPARIN SODIUM 100 MG/ML
40 INJECTION SUBCUTANEOUS EVERY 24 HOURS
Refills: 0 | Status: DISCONTINUED | OUTPATIENT
Start: 2023-08-24 | End: 2023-08-24

## 2023-08-23 RX ADMIN — Medication 975 MILLIGRAM(S): at 18:27

## 2023-08-23 RX ADMIN — Medication 975 MILLIGRAM(S): at 18:54

## 2023-08-23 RX ADMIN — Medication 975 MILLIGRAM(S): at 08:14

## 2023-08-23 RX ADMIN — LEVETIRACETAM 500 MILLIGRAM(S): 250 TABLET, FILM COATED ORAL at 18:26

## 2023-08-23 RX ADMIN — Medication 1 APPLICATION(S): at 23:00

## 2023-08-23 RX ADMIN — Medication 975 MILLIGRAM(S): at 08:59

## 2023-08-23 RX ADMIN — Medication 1 TABLET(S): at 18:24

## 2023-08-23 NOTE — PHYSICAL THERAPY INITIAL EVALUATION ADULT - ADDITIONAL COMMENTS
as per pt: PTA pt was living in a PH + Stairs and was independent in all functional mobility and ADL's. no AD for gait. lives with daughter and her family
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
## 2023-08-23 NOTE — PHYSICAL THERAPY INITIAL EVALUATION ADULT - RANGE OF MOTION EXAMINATION, REHAB EVAL
RUE in sling/Left LE ROM was WFL (within functional limits)/bilateral lower extremity ROM was WFL (within functional limits)

## 2023-08-23 NOTE — DISCHARGE NOTE PROVIDER - NSDCMRMEDTOKEN_GEN_ALL_CORE_FT
acetaminophen 325 mg oral tablet: 3 tab(s) orally every 6 hours  amoxicillin-clavulanate 875 mg-125 mg oral tablet: 1 tab(s) orally 2 times a day  bacitracin 500 units/g topical ointment: 1 Apply topically to affected area 3 times a day  levETIRAcetam 500 mg oral tablet: 1 tab(s) orally 2 times a day  oxyCODONE 5 mg oral tablet: 1 tab(s) orally every 6 hours as needed for Severe Pain (7 - 10) MDD: 4

## 2023-08-23 NOTE — CONSULT NOTE ADULT - ASSESSMENT
ASSESSMENT/PLAN:   MARCUS HUERTA is a 67yFemale s/p     >> At this time, there is no indication for acute surgical intervention. Nonoperative orbital floor fracture based on imaging and clinical exam findings.   >> Examined and cleared by optho  >> Continuous ice packs or cold compresses to the affected area. 20 minutes on, 20 minutes off.  >> Pain control.  >> Head of bed elevation.  >> Broad-spectrum antibiotics x7 days.  >> Bacitracin to all abrasions three times daily.   >> Sinus precautions (1) do not blow your nose; (2) do not place objects in your nose; (3) do not engage in strenuous activities or activities in which facial trauma may be possible; and (4) sneeze with an open mouth.  >> The patient has been instructed to follow up with Dr. Lennon. You may call [phone] to schedule an appointment.   >> The above plan has been discussed with Dr. Lennon who agrees with the above.     Mell Ding MD PGY4  Plastic Surgery   [facility]: [id number]  [facility]: [phone]

## 2023-08-23 NOTE — PHYSICAL THERAPY INITIAL EVALUATION ADULT - ACTIVE RANGE OF MOTION EXAMINATION, REHAB EVAL
RUE in sling/Left LE Active ROM was WFL (within functional limits)/bilateral  lower extremity Active ROM was WFL (within functional limits)
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
## 2023-08-23 NOTE — DISCHARGE NOTE PROVIDER - NSFOLLOWUPCLINICS_GEN_ALL_ED_FT
[facility] Ophthalmology  Ophthalmology  [address] [place] [address], [facility] [address]  [place] [address]  Phone: [phone]  Fax:

## 2023-08-23 NOTE — DISCHARGE NOTE PROVIDER - CARE PROVIDER_API CALL
Herve Lennon  Plastic Surgery  [address], [address]  [place] [address]  Phone: [phone]  Fax: [phone]  Follow Up Time: 1 week    Tima Tomas  Orthopaedic Surgery  [address], [address]  [place] [address]  Phone: [phone]  Fax: [phone]  Follow Up Time: 1 week    Buzz Laureano  Neurosurgery  [address], [address]  [place] [address]  Phone: [phone]  Fax: [phone]  Follow Up Time: 1 week    Chucky Holland  Surgery  [address], [address]  [place] [address]  Phone: [phone]  Fax: [phone]  Follow Up Time:

## 2023-08-23 NOTE — DISCHARGE NOTE PROVIDER - NSDCFUADDINST_GEN_ALL_CORE_FT
A prescription for oxycodone has been sent to the pharmacy. You should only take these for severe pain. For mild or moderate pain, you may take 975mg of tylenol every 6 hours. Do not exceed more than 4G per day. Please take a stool softener (senna, colace, or miralax) while taking narcotic pain medication to avoid opioid-induced constipation.

## 2023-08-23 NOTE — PROGRESS NOTE ADULT - ATTENDING COMMENTS
seen and examined 08-23-23 @ 1032    PERRL  EOMI  right periorbital ecchymosis  RUE in sling      hgb - 10.0 -> 8.4 g/dL    acute-on-chronic normocytic anemia  -repeat CBC tomorrw    traumatic left frontal SAH  traumatic right frontoparietal SAH  -stable on multiple repeat CT head (8/22 1933, 8/23 0838)  -start chemical VTE prophylaxis on 8/24    right orbital floor fracture  -f/u with plastic surgery as an outpatient    right humeral head fracture  -NWB RUE in sling  -f/u with ortho as an outpatient      I reviewed her labs and radiologic images.  plan discussed with her daughter at bedside seen and examined 08-23-23 @ 1032    PERRL  EOMI  right periorbital ecchymosis  RUE in sling      hgb - 10.0 -> 8.4 g/dL    acute-on-chronic normocytic anemia  -repeat CBC tomorrow    traumatic left frontal SAH  traumatic right frontoparietal SAH  -stable on multiple repeat CT head (8/22 1933, 8/23 0838)  -start chemical VTE prophylaxis on 8/24    right orbital floor fracture  -f/u with plastic surgery as an outpatient    right humeral head fracture  -NWB RUE in sling  -f/u with ortho as an outpatient      I reviewed her labs and radiologic images.  plan discussed with her daughter at bedside

## 2023-08-23 NOTE — DISCHARGE NOTE PROVIDER - NSDCFUADDAPPT_GEN_ALL_CORE_FT
Please follow up with your primary care doctor within 1-2 weeks of discharge from the hospital.   Please follow up with your Trauma Doctor only if needed at [address] [facility] [address] [place] [address].   Phone #[phone].

## 2023-08-23 NOTE — DISCHARGE NOTE PROVIDER - PROVIDER TOKENS
PROVIDER:[TOKEN:[[id number]],FOLLOWUP:[1 week]],PROVIDER:[TOKEN:[[id number]],FOLLOWUP:[1 week]],PROVIDER:[TOKEN:[[id number]],FOLLOWUP:[1 week]],PROVIDER:[TOKEN:[[id number]]]
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
## 2023-08-23 NOTE — CHART NOTE - NSCHARTNOTEFT_GEN_A_CORE
Discussed the findings of left shoulder, humerus, and elbow xray with Dr. Jeet Marie, Radiologist over TEAMS. He noted a linear lucency along the coronoid process of the ulna which may be due to an overlying bone spur versus a nondisplaced fracture. He recommended evaluating for point tenderness at this location. Given that the patient complains of elbow pain, he recommended a CT elbow without contrast. Discussed this recommendation with Mauro from orthopedics, who said that a CT scan would not change their original recommendation to follow up in 1 week as an outpatient. Will defer CT elbow at this time.

## 2023-08-23 NOTE — DISCHARGE NOTE PROVIDER - CARE PROVIDERS DIRECT ADDRESSES
TSH is fine    Cholesterol is okay. (Trig up because she wasn't fasting)    CMP okay, Sugar was 143.  Her kidney and liver are fine    HgA1c 6.7  Remains controlled diabetic range. Focus on diet like we discussed  
,[email or web address]

## 2023-08-23 NOTE — DISCHARGE NOTE PROVIDER - NSDCCPCAREPLAN_GEN_ALL_CORE_FT
PRINCIPAL DISCHARGE DIAGNOSIS  Diagnosis: Subarachnoid hemorrhage  Assessment and Plan of Treatment: Seen by neurosurgery while in the hospital.   Continue to take Keppra for a 7 day course.  Follow up outpatient with Dr. Laureano in 1-2 weeks.      SECONDARY DISCHARGE DIAGNOSES  Diagnosis: Humeral fracture  Assessment and Plan of Treatment: Seen by orthopedics while in the hospital.   Non-weight bearing right upper extremity in a sling, ice/cold compress  Follow up outpatient in Dr. Tomas's office in 1-2 weeks after discharge, call office to make appointment.    Diagnosis: Orbital floor fracture  Assessment and Plan of Treatment: Seen by plastic surgery while in the hospital.   Continuous ice packs or cold compresses to the affected area. 20 minutes on, 20 minutes off. Head of bed elevation. Broad-spectrum antibiotics x7 days, ordered for Augmentin. Bacitracin to all abrasions three times daily. Sinus precautions (1) do not blow your nose; (2) do not place objects in your nose; (3) do not engage in strenuous activities or activities in which facial trauma may be possible; and (4) sneeze with an open mouth. The patient has been instructed to follow up with Dr. Lennon in 1-2 weeks. You may call [phone] to schedule an appointment.  Seen by ophthalmology while in the hospital.  No indication for surgical intervention from ophthalmic standpoint  Pt was instructed to avoid nose blowing, straining, sneezing, no straws. Head of bed elevation at 30-degrees when at rest.  Outpatient follow-up: Patient should follow-up with his/her ophthalmologist or with [facility] Department of Ophthalmology upon discharge at the address below:  [facility] Department of Ophthalmology  [address] [place] [address]. [address]  [place] [address]  [phone]    Diagnosis: Maxillary fracture  Assessment and Plan of Treatment: Seen by plastic surgery while in the hospital.   Continuous ice packs or cold compresses to the affected area. 20 minutes on, 20 minutes off. Head of bed elevation. Broad-spectrum antibiotics x7 days, ordered for Augmentin. Bacitracin to all abrasions three times daily. Sinus precautions (1) do not blow your nose; (2) do not place objects in your nose; (3) do not engage in strenuous activities or activities in which facial trauma may be possible; and (4) sneeze with an open mouth. The patient has been instructed to follow up with Dr. Lennon. You may call [phone] to schedule an appointment.    Diagnosis: Traumatic subdural hemorrhage  Assessment and Plan of Treatment:     Diagnosis: At risk for glaucoma  Assessment and Plan of Treatment: Glaucoma suspect  - no prior history, no family history   - IOP 23, 20 today   - CDR asymmetry left greater than right   - recommend outpatient workup with further diagnostic testing and evaluation following discharge   Outpatient follow-up: Patient should follow-up with his/her ophthalmologist or with [facility] Department of Ophthalmology upon discharge at the address below   [facility] Department of Ophthalmology  [address] [place] [address]. [address]  [place] [address]  [phone]

## 2023-08-23 NOTE — CHART NOTE - NSCHARTNOTEFT_GEN_A_CORE
Tertiary Trauma Survey (TTS)    Date of TTS: 08/23/2023            Time: 11:30 am  Admit Date: 08/22/2023             Trauma Activation: Consult  Admit GCS: E- 4    V- 5    M- 6    HPI:  66y/o F w/ no significant PMHx/PSHx but takes ASA81 twice a week (last taken yesterday) who presents as trauma transfer from [facility] s/p mechanical fall with SDH, SAH, R humeral head fx and R orbital blowout fx. Patient reports that she was crossing the street and saw the bus coming so walked faster and tripped and fell on her right side. +HS, no LOC. Patient reports R shoulder pain and R eye pain. Denies abdominal pain, nausea, vomiting, fevers, chills, chest pain, SOB.    In the ED, patient is afebrile, hypertensive to 183/97. Labs significant for WBC 17.42, Cr 1.59. CTH w/ findings of SAH, R maxillary and orbital floor fx.  (22 Aug 2023 21:48)    PAST MEDICAL & SURGICAL HISTORY:  No pertinent past medical history    No significant past surgical history    Medications (inpatient): acetaminophen     Tablet .. 975 milliGRAM(s) Oral every 6 hours  levETIRAcetam 500 milliGRAM(s) Oral two times a day    Medications (PRN):oxyCODONE    IR 2.5 milliGRAM(s) Oral every 4 hours PRN  oxyCODONE    IR 5 milliGRAM(s) Oral every 4 hours PRN    Allergies: Allergy Status Unknown  (Intolerances: )    Vital Signs Last 24 Hrs  T(C): 36.7 (23 Aug 2023 08:26), Max: 36.9 (22 Aug 2023 19:10)  T(F): 98.1 (23 Aug 2023 08:26), Max: 98.5 (22 Aug 2023 22:35)  HR: 70 (23 Aug 2023 08:26) (69 - 74)  BP: 115/76 (23 Aug 2023 08:26) (100/67 - 183/97)  BP(mean): 115 (22 Aug 2023 19:10) (115 - 115)  RR: 18 (23 Aug 2023 08:26) (16 - 20)  SpO2: 96% (23 Aug 2023 08:26) (94% - 99%)    Parameters below as of 23 Aug 2023 08:26  Patient On (Oxygen Delivery Method): room air      Drug Dosing Weight  Height (cm): 154.9 (22 Aug 2023 19:07)  Weight (kg): 64.4 (22 Aug 2023 19:07)  BMI (kg/m2): 26.8 (22 Aug 2023 19:07)  BSA (m2): 1.63 (22 Aug 2023 19:07)    PHYSICAL EXAM:  GEN: resting comfortably in bed, in NAD   HEAD: normocephalic, nontender to palpation   NECK: no JVD, nontender to palpation   CHEST: nontender to palpation across clavicles and b/l anterior ribs   BACK: nontender to palpation along midline and b/l posterior ribs   ABD: soft, nontender, nondistended   EXTREM: b/l UE non-tender to palpation                   b/l LE non-tender to palpation                    Moving all extremities spontaneously; warm, well-perfused, palpable distal pulses   NEURO: AOx3, no focal neuro deficits                           8.4    12.17 )-----------( 183      ( 23 Aug 2023 06:20 )             25.2     08-23    138  |  106  |  29<H>  ----------------------------<  114<H>  3.8   |  19<L>  |  1.64<H>    Ca    9.1      23 Aug 2023 06:20  Phos  3.5     08-23  Mg     1.9     08-23    TPro  8.4<H>  /  Alb  4.7  /  TBili  0.9  /  DBili  x   /  AST  43<H>  /  ALT  26  /  AlkPhos  74  08-22    PT/INR - ( 22 Aug 2023 20:47 )   PT: 11.1 sec;   INR: 1.06 ratio      PTT - ( 22 Aug 2023 20:47 )  PTT:20.3 sec    List Injuries Identified to Date: SDH, SAH, R humeral head fx and R orbital blowout fx.    List Operative and Interventional Radiological Procedures: None to date     Consults (Date):  [X] Neurosurgery (08/22/2023)  [X] Ophthalmology (08/22/2023)  [X] Orthopedics (08/22/2023)  [X] Plastics (08/23/2023)    RADIOLOGICAL FINDINGS REVIEW:  CXR: Acute comminuted right humeral head fracture. Clear lungs.    Right Shoulder, Humerus, and Elbow Films: Acute comminuted fracture of the right proximal humerus. No dislocation. Linear lucency along the coronoid process of the ulna which may be   due to an overlying bone spur versus a nondisplaced fracture. Please evaluate for point tenderness at this location. Mild compression deformity of a midthoracic vertebral body which is of an unspecified chronicity and etiology.    Head CT: Subarachnoid space hemorrhage. RIGHT maxillary and orbital floor fractures. Intraorbital emphysema. Extensive right periorbital edema and subcutaneous emphysema. No orbital fluid collection. Hemorrhagic fluid in the right maxillary sinus. Findings appear comparable to the outside prior CT earlier same date    INTERPRETATION: 66y/o F w/ no significant PMHx/PSHx but takes ASA81 twice a week (last taken yesterday) who presents as trauma transfer from [facility] s/p mechanical fall with SDH, SAH, R humeral head fx and R orbital blowout fx    PLAN: Appreciate NSY, Plastics, Ortho, and Ophtho recs. Tertiary Trauma Survey (TTS)    Date of TTS: 08/23/2023            Time: 11:30 am  Admit Date: 08/22/2023             Trauma Activation: Consult  Admit GCS: E- 4    V- 5    M- 6    HPI:  66y/o F w/ no significant PMHx/PSHx but takes ASA81 twice a week (last taken yesterday) who presents as trauma transfer from [facility] s/p mechanical fall with SDH, SAH, R humeral head fx and R orbital blowout fx. Patient reports that she was crossing the street and saw the bus coming so walked faster and tripped and fell on her right side. +HS, no LOC. Patient reports R shoulder pain and R eye pain. Denies abdominal pain, nausea, vomiting, fevers, chills, chest pain, SOB.    In the ED, patient is afebrile, hypertensive to 183/97. Labs significant for WBC 17.42, Cr 1.59. CTH w/ findings of SAH, R maxillary and orbital floor fx.  (22 Aug 2023 21:48)    PAST MEDICAL & SURGICAL HISTORY:  No pertinent past medical history    No significant past surgical history    Medications (inpatient): acetaminophen     Tablet .. 975 milliGRAM(s) Oral every 6 hours  levETIRAcetam 500 milliGRAM(s) Oral two times a day    Medications (PRN):oxyCODONE    IR 2.5 milliGRAM(s) Oral every 4 hours PRN  oxyCODONE    IR 5 milliGRAM(s) Oral every 4 hours PRN    Allergies: Allergy Status Unknown  (Intolerances: )    Vital Signs Last 24 Hrs  T(C): 36.7 (23 Aug 2023 08:26), Max: 36.9 (22 Aug 2023 19:10)  T(F): 98.1 (23 Aug 2023 08:26), Max: 98.5 (22 Aug 2023 22:35)  HR: 70 (23 Aug 2023 08:26) (69 - 74)  BP: 115/76 (23 Aug 2023 08:26) (100/67 - 183/97)  BP(mean): 115 (22 Aug 2023 19:10) (115 - 115)  RR: 18 (23 Aug 2023 08:26) (16 - 20)  SpO2: 96% (23 Aug 2023 08:26) (94% - 99%)    Parameters below as of 23 Aug 2023 08:26  Patient On (Oxygen Delivery Method): room air      Drug Dosing Weight  Height (cm): 154.9 (22 Aug 2023 19:07)  Weight (kg): 64.4 (22 Aug 2023 19:07)  BMI (kg/m2): 26.8 (22 Aug 2023 19:07)  BSA (m2): 1.63 (22 Aug 2023 19:07)    PHYSICAL EXAM:  GEN: resting comfortably in bed, in NAD   HEAD: normocephalic, nontender to palpation. Cranial nerves II-XII grossly intact.   NECK: no JVD, nontender to palpation   CHEST: nontender to palpation across clavicles and b/l anterior ribs   BACK: nontender to palpation along midline and b/l posterior ribs   ABD: soft, nontender, nondistended   EXTREM: Left upper extremity nontender to palpation. Right upper extremity tender over elbow.  strength equal bilaterally.                    b/l LE non-tender to palpation                    Moving all extremities spontaneously; warm, well-perfused, palpable distal pulses   NEURO: AOx3, no focal neuro deficits                           8.4    12.17 )-----------( 183      ( 23 Aug 2023 06:20 )             25.2     08-23    138  |  106  |  29<H>  ----------------------------<  114<H>  3.8   |  19<L>  |  1.64<H>    Ca    9.1      23 Aug 2023 06:20  Phos  3.5     08-23  Mg     1.9     08-23    TPro  8.4<H>  /  Alb  4.7  /  TBili  0.9  /  DBili  x   /  AST  43<H>  /  ALT  26  /  AlkPhos  74  08-22    PT/INR - ( 22 Aug 2023 20:47 )   PT: 11.1 sec;   INR: 1.06 ratio      PTT - ( 22 Aug 2023 20:47 )  PTT:20.3 sec    List Injuries Identified to Date: SDH, SAH, R humeral head fx and R orbital blowout fx.    List Operative and Interventional Radiological Procedures: None to date     Consults (Date):  [X] Neurosurgery (08/22/2023)  [X] Ophthalmology (08/22/2023)  [X] Orthopedics (08/22/2023)  [X] Plastics (08/23/2023)    RADIOLOGICAL FINDINGS REVIEW:  CXR: Acute comminuted right humeral head fracture. Clear lungs.    Right Shoulder, Humerus, and Elbow Films: Acute comminuted fracture of the right proximal humerus. No dislocation. Linear lucency along the coronoid process of the ulna which may be   due to an overlying bone spur versus a nondisplaced fracture. Please evaluate for point tenderness at this location. Mild compression deformity of a midthoracic vertebral body which is of an unspecified chronicity and etiology.    Head CT: Subarachnoid space hemorrhage. RIGHT maxillary and orbital floor fractures. Intraorbital emphysema. Extensive right periorbital edema and subcutaneous emphysema. No orbital fluid collection. Hemorrhagic fluid in the right maxillary sinus. Findings appear comparable to the outside prior CT earlier same date    INTERPRETATION: 66y/o F w/ no significant PMHx/PSHx but takes ASA81 twice a week (last taken yesterday) who presents as trauma transfer from [facility] s/p mechanical fall with SDH, SAH, R humeral head fx and R orbital blowout fx    PLAN: Appreciate NSY, Plastics, Ortho, and Ophtho recs.

## 2023-08-23 NOTE — CONSULT NOTE ADULT - SUBJECTIVE AND OBJECTIVE BOX
Plastic Surgery Consult Note  (pg [facility]: [id number], [facility]: [phone])    HPI:  66y/o F w/ no significant PMHx/PSHx but takes ASA81 twice a week (last taken yesterday) [facility] with SDH, SAH, R humeral head fx and R orbital blowout fx. Patient reports that she was crossing the street and saw the bus coming so walked faster and tripped and fell on her right side. +HS, no LOC. Patient reports R shoulder pain and R eye pain. Denies abdominal pain, nausea, vomiting, fevers, chills, chest pain, SOB.  In the ED, patient is afebrile, hypertensive to 183/97. Labs significant for WBC 17.42, Cr 1.59. CTH w/ findings of SAH, R maxillary and orbital floor fx.  (22 Aug 2023 21:48)    Awaiting repeat CTH this morning. Otherwise fine. Seen and examined at bedside.     PAST MEDICAL & SURGICAL HISTORY:  No pertinent past medical history  No significant past surgical history    Allergies  Allergy Status Unknown    Home Medications:    MEDICATIONS  (STANDING):  acetaminophen     Tablet .. 975 milliGRAM(s) Oral every 6 hours  levETIRAcetam 500 milliGRAM(s) Oral two times a day      ___________________________________________  OBJECTIVE:  Vital Signs Last 24 Hrs  T(C): 36.7 (23 Aug 2023 08:26), Max: 36.9 (22 Aug 2023 19:10)  T(F): 98.1 (23 Aug 2023 08:26), Max: 98.5 (22 Aug 2023 22:35)  HR: 70 (23 Aug 2023 08:26) (69 - 74)  BP: 115/76 (23 Aug 2023 08:26) (100/67 - 183/97)  BP(mean): 115 (22 Aug 2023 19:10) (115 - 115)  RR: 18 (23 Aug 2023 08:26) (16 - 20)  SpO2: 96% (23 Aug 2023 08:26) (94% - 99%)    Parameters below as of 23 Aug 2023 08:26  Patient On (Oxygen Delivery Method): room air    CAPILLARY BLOOD GLUCOSE        I&O's Detail    22 Aug 2023 07:01  -  23 Aug 2023 07:00  --------------------------------------------------------  IN:    Oral Fluid: 250 mL  Total IN: 250 mL    OUT:  Total OUT: 0 mL    Total NET: 250 mL      >> VITAL SIGNS: Afebrile. Stable.  >> CONSTITUTIONAL: AOx3. NAD.   >> HEENT:        Gross examination of the head is remarkable for right periorbital ecchymosis.        The upper third of the face demonstrates right periorbital ecchymosis. There are no step-offs, tenderness to palpation, or crepitus. No evidence of enophthalmos or vertical dystopia. No chemosis or subconjunctival hemorrhage.        Middle third: no step-offs, tenderness to palpation, or crepitus. Examination of the ears is unremarkable bilaterally: there is no evidence of otorrhea, hemotympanum, or Anderson’s sign. Intranasal examination is unremarkable bilaterally: there is no evidence of acute or active bleeding or septal hematoma.        Lower third: There are no step-offs, tenderness to palpation, or crepitus. Intraoral examination is unremarkable. TMJ's unremarkable bilaterally. The patient is able to bring teeth into occlusion. Maximal incisal opening 4-5cm.  >> RESPIRATORY: unlabored respirations  >> ABDOMEN: Soft. Nontender. Nondistended.  >> NEUROLOGICAL: Pupils are equal, round, and reactive to light and accommodation bilaterally. Visual fields intact by confrontation bilaterally. Extraocular movements intact to all directions. Facial motor intact and symmetric bilaterally. Facial sensory intact and symmetric bilaterally. CN 8-10 intact. Shoulder shrug equal and symmetric bilaterally. Tongue protrudes in midline. Motor and sensory are grossly intact in bilateral upper and lower extremities.   ____________________________________________  LABS:  CBC Full  -  ( 23 Aug 2023 06:20 )  WBC Count : 12.17 K/uL  RBC Count : 2.96 M/uL  Hemoglobin : 8.4 g/dL  Hematocrit : 25.2 %  Platelet Count - Automated : 183 K/uL  Mean Cell Volume : 85.1 fl  Mean Cell Hemoglobin : 28.4 pg  Mean Cell Hemoglobin Concentration : 33.3 gm/dL  Auto Neutrophil # : x  Auto Lymphocyte # : x  Auto Monocyte # : x  Auto Eosinophil # : x  Auto Basophil # : x  Auto Neutrophil % : x  Auto Lymphocyte % : x  Auto Monocyte % : x  Auto Eosinophil % : x  Auto Basophil % : x    08-23    138  |  106  |  29<H>  ----------------------------<  114<H>  3.8   |  19<L>  |  1.64<H>    Ca    9.1      23 Aug 2023 06:20  Phos  3.5     08-23  Mg     1.9     08-23    TPro  8.4<H>  /  Alb  4.7  /  TBili  0.9  /  DBili  x   /  AST  43<H>  /  ALT  26  /  AlkPhos  74  08-22    LIVER FUNCTIONS - ( 22 Aug 2023 19:32 )  Alb: 4.7 g/dL / Pro: 8.4 g/dL / ALK PHOS: 74 U/L / ALT: 26 U/L / AST: 43 U/L / GGT: x           PT/INR - ( 22 Aug 2023 20:47 )   PT: 11.1 sec;   INR: 1.06 ratio         PTT - ( 22 Aug 2023 20:47 )  PTT:20.3 sec  Urinalysis Basic - ( 23 Aug 2023 06:20 )    Color: x / Appearance: x / SG: x / pH: x  Gluc: 114 mg/dL / Ketone: x  / Bili: x / Urobili: x   Blood: x / Protein: x / Nitrite: x   Leuk Esterase: x / RBC: x / WBC x   Sq Epi: x / Non Sq Epi: x / Bacteria: x

## 2023-08-24 ENCOUNTER — TRANSCRIPTION ENCOUNTER (OUTPATIENT)
Age: 68
End: 2023-08-24

## 2023-08-24 VITALS
DIASTOLIC BLOOD PRESSURE: 81 MMHG | OXYGEN SATURATION: 97 % | HEART RATE: 84 BPM | RESPIRATION RATE: 18 BRPM | SYSTOLIC BLOOD PRESSURE: 144 MMHG | TEMPERATURE: 98 F

## 2023-08-24 LAB
A1C WITH ESTIMATED AVERAGE GLUCOSE RESULT: 5.6 % — SIGNIFICANT CHANGE UP (ref 4–5.6)
ANION GAP SERPL CALC-SCNC: 12 MMOL/L — SIGNIFICANT CHANGE UP (ref 5–17)
BUN SERPL-MCNC: 25 MG/DL — HIGH (ref 7–23)
CALCIUM SERPL-MCNC: 9.3 MG/DL — SIGNIFICANT CHANGE UP (ref 8.4–10.5)
CHLORIDE SERPL-SCNC: 104 MMOL/L — SIGNIFICANT CHANGE UP (ref 96–108)
CO2 SERPL-SCNC: 19 MMOL/L — LOW (ref 22–31)
CREAT SERPL-MCNC: 1.47 MG/DL — HIGH (ref 0.5–1.3)
EGFR: 39 ML/MIN/1.73M2 — LOW
ESTIMATED AVERAGE GLUCOSE: 114 MG/DL — SIGNIFICANT CHANGE UP (ref 68–114)
GLUCOSE SERPL-MCNC: 108 MG/DL — HIGH (ref 70–99)
HCT VFR BLD CALC: 24.8 % — LOW (ref 34.5–45)
HGB BLD-MCNC: 8.3 G/DL — LOW (ref 11.5–15.5)
MAGNESIUM SERPL-MCNC: 2.1 MG/DL — SIGNIFICANT CHANGE UP (ref 1.6–2.6)
MCHC RBC-ENTMCNC: 28.3 PG — SIGNIFICANT CHANGE UP (ref 27–34)
MCHC RBC-ENTMCNC: 33.5 GM/DL — SIGNIFICANT CHANGE UP (ref 32–36)
MCV RBC AUTO: 84.6 FL — SIGNIFICANT CHANGE UP (ref 80–100)
NRBC # BLD: 0 /100 WBCS — SIGNIFICANT CHANGE UP (ref 0–0)
PHOSPHATE SERPL-MCNC: 2.9 MG/DL — SIGNIFICANT CHANGE UP (ref 2.5–4.5)
PLATELET # BLD AUTO: 190 K/UL — SIGNIFICANT CHANGE UP (ref 150–400)
POTASSIUM SERPL-MCNC: 4 MMOL/L — SIGNIFICANT CHANGE UP (ref 3.5–5.3)
POTASSIUM SERPL-SCNC: 4 MMOL/L — SIGNIFICANT CHANGE UP (ref 3.5–5.3)
RBC # BLD: 2.93 M/UL — LOW (ref 3.8–5.2)
RBC # FLD: 16.7 % — HIGH (ref 10.3–14.5)
SODIUM SERPL-SCNC: 135 MMOL/L — SIGNIFICANT CHANGE UP (ref 135–145)
WBC # BLD: 11.02 K/UL — HIGH (ref 3.8–10.5)
WBC # FLD AUTO: 11.02 K/UL — HIGH (ref 3.8–10.5)

## 2023-08-24 RX ORDER — OXYCODONE HYDROCHLORIDE 5 MG/1
1 TABLET ORAL
Qty: 6 | Refills: 0
Start: 2023-08-24

## 2023-08-24 RX ORDER — LEVETIRACETAM 250 MG/1
1 TABLET, FILM COATED ORAL
Qty: 12 | Refills: 0
Start: 2023-08-24 | End: 2023-08-29

## 2023-08-24 RX ORDER — BACITRACIN ZINC 500 UNIT/G
1 OINTMENT IN PACKET (EA) TOPICAL
Qty: 0 | Refills: 0 | DISCHARGE
Start: 2023-08-24

## 2023-08-24 RX ORDER — ACETAMINOPHEN 500 MG
3 TABLET ORAL
Qty: 0 | Refills: 0 | DISCHARGE
Start: 2023-08-24

## 2023-08-24 RX ADMIN — Medication 1 APPLICATION(S): at 05:21

## 2023-08-24 RX ADMIN — Medication 1 APPLICATION(S): at 13:05

## 2023-08-24 RX ADMIN — Medication 1 TABLET(S): at 05:20

## 2023-08-24 RX ADMIN — Medication 975 MILLIGRAM(S): at 12:13

## 2023-08-24 RX ADMIN — OXYCODONE HYDROCHLORIDE 2.5 MILLIGRAM(S): 5 TABLET ORAL at 05:51

## 2023-08-24 RX ADMIN — Medication 975 MILLIGRAM(S): at 05:51

## 2023-08-24 RX ADMIN — OXYCODONE HYDROCHLORIDE 2.5 MILLIGRAM(S): 5 TABLET ORAL at 05:21

## 2023-08-24 RX ADMIN — Medication 975 MILLIGRAM(S): at 11:13

## 2023-08-24 RX ADMIN — ENOXAPARIN SODIUM 40 MILLIGRAM(S): 100 INJECTION SUBCUTANEOUS at 11:13

## 2023-08-24 RX ADMIN — Medication 975 MILLIGRAM(S): at 05:21

## 2023-08-24 RX ADMIN — LEVETIRACETAM 500 MILLIGRAM(S): 250 TABLET, FILM COATED ORAL at 05:20

## 2023-08-24 NOTE — PROGRESS NOTE ADULT - SUBJECTIVE AND OBJECTIVE BOX
Surgery Progress Note    Subjective: Patient seen and examined. No complaints and in no apparent distress.     Vital Signs Last 24 Hrs  T(C): 36.6 (23 Aug 2023 04:20), Max: 36.9 (22 Aug 2023 19:10)  T(F): 97.9 (23 Aug 2023 04:20), Max: 98.5 (22 Aug 2023 22:35)  HR: 69 (23 Aug 2023 04:20) (69 - 74)  BP: 100/67 (23 Aug 2023 04:20) (100/67 - 183/97)  BP(mean): 115 (22 Aug 2023 19:10) (115 - 115)  RR: 18 (23 Aug 2023 04:20) (16 - 20)  SpO2: 96% (23 Aug 2023 04:20) (94% - 99%)    Parameters below as of 23 Aug 2023 04:20  Patient On (Oxygen Delivery Method): room air    General Appearance: Appears well, NAD  Neck: Supple  Chest: Equal expansion bilaterally, equal breath sounds  CV: Pulse regular presently  Abdomen: Soft, nontense, nondistended   Extremities: Grossly symmetric, SCD's in place     I&O's Summary    22 Aug 2023 07:01  -  23 Aug 2023 07:00  --------------------------------------------------------  IN: 250 mL / OUT: 0 mL / NET: 250 mL      I&O's Detail    22 Aug 2023 07:01  -  23 Aug 2023 07:00  --------------------------------------------------------  IN:    Oral Fluid: 250 mL  Total IN: 250 mL    OUT:  Total OUT: 0 mL    Total NET: 250 mL          MEDICATIONS  (STANDING):  acetaminophen     Tablet .. 975 milliGRAM(s) Oral every 6 hours  levETIRAcetam 500 milliGRAM(s) Oral two times a day    MEDICATIONS  (PRN):  oxyCODONE    IR 2.5 milliGRAM(s) Oral every 4 hours PRN Moderate Pain (4 - 6)  oxyCODONE    IR 5 milliGRAM(s) Oral every 4 hours PRN Severe Pain (7 - 10)      LABS:                        8.4    12.17 )-----------( 183      ( 23 Aug 2023 06:20 )             25.2     08-23    138  |  106  |  29<H>  ----------------------------<  114<H>  3.8   |  19<L>  |  1.64<H>    Ca    9.1      23 Aug 2023 06:20  Phos  3.5     08-23  Mg     1.9     08-23    TPro  8.4<H>  /  Alb  4.7  /  TBili  0.9  /  DBili  x   /  AST  43<H>  /  ALT  26  /  AlkPhos  74  08-22    PT/INR - ( 22 Aug 2023 20:47 )   PT: 11.1 sec;   INR: 1.06 ratio      PTT - ( 22 Aug 2023 20:47 )  PTT:20.3 sec        
[facility] DEPARTMENT OF OPHTHALMOLOGY  ------------------------------------------------------------------------------  Buzz Delcid MD, PGY3  Also available on [employer] Teams  ------------------------------------------------------------------------------    Interval History: No acute events overnight. Today patient denying blurred vision, eye pain, flashes, floaters, FBS, erythema, or discharge.     MEDICATIONS  (STANDING):  acetaminophen     Tablet .. 975 milliGRAM(s) Oral every 6 hours  amoxicillin  875 milliGRAM(s)/clavulanate 1 Tablet(s) Oral two times a day  bacitracin   Ointment 1 Application(s) Topical three times a day  levETIRAcetam 500 milliGRAM(s) Oral two times a day    MEDICATIONS  (PRN):  oxyCODONE    IR 5 milliGRAM(s) Oral every 4 hours PRN Severe Pain (7 - 10)  oxyCODONE    IR 2.5 milliGRAM(s) Oral every 4 hours PRN Moderate Pain (4 - 6)      VITALS: T(C): 36.6 (08-23-23 @ 21:33)  T(F): 97.9 (08-23-23 @ 21:33), Max: 98.5 (08-22-23 @ 22:35)  HR: 69 (08-23-23 @ 21:33) (62 - 74)  BP: 155/90 (08-23-23 @ 21:33) (100/67 - 155/90)  RR:  (18 - 18)  SpO2:  (93% - 97%)  Wt(kg): --  AAOx3    Ophthalmology Exam:  Visual acuity (cc): 20/40 ph 20/30 -1 OD, 20/30 OS phni   Pupils: PERRL OU, no APD  Extraocular movements (EOMs): Full OU, no pain, no diplopia  Confrontational Visual Field (CVF): Full OU    Pen Light Exam (PLE)  External: significant right periorbital swelling and ecchymosis most prominent inferiorly, flat OS  Lids/Lashes/Lacrimal Ducts: upper and lower lid swelling OD, flat OS  Sclera/Conjunctiva: conjunctival chemosis most prominent temporally OD, white and quiet OS  Cornea: Cl OU  Anterior Chamber: D+F OU    Iris: Flat OU  Lens: NS OU     Fundus Exam: dilated with 1% tropicamide and 2.5% phenylephrine  Approval obtained from primary team for dilation  Patient aware that pupils can remained dilated for at least 4-6 hours  Exam performed with 20D lens    Vitreous: wnl OU  Disc, cup/disc: sharp and pink, 0.4 OD, .5 OS   Macula: wnl OU  Vessels: wnl OU  Periphery: wnl OU  
SURGERY DAILY PROGRESS NOTE      SUBJECTIVE: Pt seen and examined at bedside. Denies chest pain, SOB, palpitations, HA, fever, chills, N/V.      OBJECTIVE:  Vital Signs Last 24 Hrs  T(C): 36.7 (24 Aug 2023 08:16), Max: 36.9 (24 Aug 2023 00:15)  T(F): 98 (24 Aug 2023 08:16), Max: 98.4 (24 Aug 2023 00:15)  HR: 64 (24 Aug 2023 08:16) (62 - 74)  BP: 139/85 (24 Aug 2023 08:16) (123/83 - 166/84)  BP(mean): --  RR: 18 (24 Aug 2023 08:16) (18 - 18)  SpO2: 96% (24 Aug 2023 08:16) (93% - 97%)    Parameters below as of 24 Aug 2023 08:16  Patient On (Oxygen Delivery Method): room air        I&O's Summary    23 Aug 2023 07:01  -  24 Aug 2023 07:00  --------------------------------------------------------  IN: 240 mL / OUT: 0 mL / NET: 240 mL      PHYSICAL EXAM:  General Appearance: Appears well, NAD  Neck: Supple  Chest: Equal expansion bilaterally, equal breath sounds  CV: Pulse regular presently  Abdomen: Soft, nontense, nondistended   Extremities: Grossly symmetric, SCD's in place, RUE in sling    LABS:                        8.4    12.17 )-----------( 183      ( 23 Aug 2023 06:20 )             25.2     08-23    138  |  106  |  29<H>  ----------------------------<  114<H>  3.8   |  19<L>  |  1.64<H>    Ca    9.1      23 Aug 2023 06:20  Phos  3.5     08-23  Mg     1.9     08-23    TPro  8.4<H>  /  Alb  4.7  /  TBili  0.9  /  DBili  x   /  AST  43<H>  /  ALT  26  /  AlkPhos  74  08-22    PT/INR - ( 22 Aug 2023 20:47 )   PT: 11.1 sec;   INR: 1.06 ratio         PTT - ( 22 Aug 2023 20:47 )  PTT:20.3 sec  Urinalysis Basic - ( 23 Aug 2023 06:20 )    Color: x / Appearance: x / SG: x / pH: x  Gluc: 114 mg/dL / Ketone: x  / Bili: x / Urobili: x   Blood: x / Protein: x / Nitrite: x   Leuk Esterase: x / RBC: x / WBC x   Sq Epi: x / Non Sq Epi: x / Bacteria: x        RADIOLOGY & ADDITIONAL STUDIES:    ct< from: CT Head No Cont (08.23.23 @ 09:13) >    This exam is compared prior head CT performed on August 22, 2023    Acute subdural hematoma involving the high left frontal (vertex) region   is again seen. This finding measures approximately 0.8 cm widest diameter   and previously measured approximately 0.8 cm widest diameter. Adjacent   areas of subarachnoid hemorrhage is also again seen unchanged.    Acute subarachnoid hemorrhage involving the right sylvian fissure is   again seen as well.    No significant shift or herniation is seen. Impression of the osseous and   appears unremarkable    Near complete opacification of the right maxillary sinus is again seen   with associated areas of high attenuation compatible with hemorrhage.    Right periorbital soft tissue swelling/hematoma and air is again   identified.    IMPRESSION: Stable exam.    < end of copied text >

## 2023-08-24 NOTE — DISCHARGE NOTE NURSING/CASE MANAGEMENT/SOCIAL WORK - NSDCPEFALRISK_GEN_ALL_CORE
For information on Fall & Injury Prevention, visit: [email or web address] OR  [email or web address] OR  https://www.cdc.gov/steadi/patient.html

## 2023-08-24 NOTE — PROGRESS NOTE ADULT - ASSESSMENT
67F, takes ASA81 twice a wk for ppx (last taken Sunday), no major pmh, s/p mechanical fall today, found to have small acute SDH, and right maxillary and orbital floor fracture.     # maxillary and inferior orbital floor fracture, right  -CT showing maxillary and orbital floor fractures. Intraorbital emphysema.    Extensive right periorbital edema and subcutaneous emphysema. No orbital   fluid collection. Hemorrhagic fluid in the right maxillary sinus  -EOM full with no diplopia or pain with movement OD   -No entrapment symptoms i.e nausea/vomiting, bradycardia.  -On exam, patient with periorbital swelling and chemosis most prominent temporally. Posterior segment exam unremarkable.    -No indication for surgical intervention from ophthalmic standpoint  -OMFS/facial plastics evaluation before discharge  -Ice packs to left eye q1h  -Pt was instructed to avoid nose blowing, straining, sneezing, no straws. Head of bed elevation at 30-degrees when at rest.  -can follow up outpatient with oculoplastics - ophtho signing off    #Glaucoma suspect  - no prior history, no family history   - CDR asymmetry left greater than right   - recommend outpatient workup with further diagnostic testing and evaluation following discharge     Seen and discussed with Dr. Lai, attending.    Outpatient follow-up: Patient should follow-up with his/her ophthalmologist or with [facility] Department of Ophthalmology upon discharge at the address below     [facility] Department of Ophthalmology  [address] [place] [address]. [address]  [place] [address]  [phone]  
66y/o F w/ no significant PMHx/PSHx but takes ASA81 twice a week (last taken yesterday) who presents as trauma transfer from [facility] s/p mechanical fall with SDH, SAH, R humeral head fx and R orbital blowout fx.    PLAN:  -Regular diet  -NSGY following - s/p DDAVP  -No AC/AP  -Keppra 500 BID x7days  -Optho following - no intervention, sinus precautions  -Plastics/face consulted -no intervention, abx x 7 days, OP f/u  -Ortho c/s - nonop, f/u OP w/ Dr. Tomas  -Pain control  -AM labs  -PT/OT: no needs  -dispo: dc home today    ACS [id number]    
68y/o F w/ no significant PMHx/PSHx but takes ASA81 twice a week (last taken yesterday) who presents as trauma transfer from [facility] s/p mechanical fall with SDH, SAH, R humeral head fx and R orbital blowout fx.    PLAN:  -Regular diet  -NSGY following - s/p DDAVP  -f/u CT Head  -No AC/AP  -Keppra 500 BID x7days  -Optho following - no intervention, sinus precautions  -Plastics/face consulted - f/u recs  -Ortho c/s - nonop, f/u OP w/ Dr. Tomas  -Pain control  -Tertiary in AM  -AM labs    ACS [id number]

## 2023-08-24 NOTE — PATIENT PROFILE ADULT - FALL HARM RISK - HARM RISK INTERVENTIONS

## 2023-08-24 NOTE — DISCHARGE NOTE NURSING/CASE MANAGEMENT/SOCIAL WORK - NSDCFUADDAPPT_GEN_ALL_CORE_FT
Please follow up with your primary care doctor within 1-2 weeks of discharge from the hospital.   Please follow up with your Trauma Doctor only if needed at [address] [facility] [address] [place] [address].   Phone #[phone].     Please follow up with your primary care doctor within 1-2 weeks of discharge from the hospital.   Please follow up with your Trauma Doctor only if needed at [address] [facility] [address] [place] [address].   Phone #[phone].           [facility]  [phone]  Provides sliding scale primary care and pharmaceutical services. Call the number provided to enroll and to locate a doctor.    Please follow up with your primary care doctor within 1-2 weeks of discharge from the hospital.   Please follow up with your Trauma Doctor only if needed at [address] [facility] [address] [place] [address].   Phone #[phone].

## 2023-08-24 NOTE — DISCHARGE NOTE NURSING/CASE MANAGEMENT/SOCIAL WORK - ADDITIONAL RESOURCE TYPE OF SERVICE
Provides a free  who can apply for meals-on-wheels, subsidized housekeeping, and personal care services.  No insurance is required.

## 2023-08-24 NOTE — DISCHARGE NOTE NURSING/CASE MANAGEMENT/SOCIAL WORK - PATIENT PORTAL LINK FT
You can access the FollowMyHealth Patient Portal offered by [facility] by registering at the following website: [email or web address]. By joining [employer]’s FollowMyHealth portal, you will also be able to view your health information using other applications (apps) compatible with our system.

## 2023-08-25 PROBLEM — Z00.00 ENCOUNTER FOR PREVENTIVE HEALTH EXAMINATION: Status: ACTIVE | Noted: 2023-08-25

## 2023-08-28 PROBLEM — Z78.9 OTHER SPECIFIED HEALTH STATUS: Chronic | Status: ACTIVE | Noted: 2023-08-22

## 2023-08-29 ENCOUNTER — NON-APPOINTMENT (OUTPATIENT)
Age: 68
End: 2023-08-29

## 2023-08-29 ENCOUNTER — APPOINTMENT (OUTPATIENT)
Dept: NEUROSURGERY | Facility: CLINIC | Age: 68
End: 2023-08-29
Payer: SELF-PAY

## 2023-08-29 VITALS
WEIGHT: 142 LBS | HEIGHT: 60 IN | SYSTOLIC BLOOD PRESSURE: 157 MMHG | OXYGEN SATURATION: 98 % | HEART RATE: 72 BPM | DIASTOLIC BLOOD PRESSURE: 92 MMHG | BODY MASS INDEX: 27.88 KG/M2

## 2023-08-29 DIAGNOSIS — I10 ESSENTIAL (PRIMARY) HYPERTENSION: ICD-10-CM

## 2023-08-29 DIAGNOSIS — S06.6X9A TRAUMATIC SUBARACHNOID HEMORRHAGE WITH LOSS OF CONSCIOUSNESS OF UNSPECIFIED DURATION, INITIAL ENCOUNTER: ICD-10-CM

## 2023-08-29 DIAGNOSIS — Z80.9 FAMILY HISTORY OF MALIGNANT NEOPLASM, UNSPECIFIED: ICD-10-CM

## 2023-08-29 DIAGNOSIS — Z78.9 OTHER SPECIFIED HEALTH STATUS: ICD-10-CM

## 2023-08-29 DIAGNOSIS — Z80.0 FAMILY HISTORY OF MALIGNANT NEOPLASM OF DIGESTIVE ORGANS: ICD-10-CM

## 2023-08-29 DIAGNOSIS — S09.93XA UNSPECIFIED INJURY OF FACE, INITIAL ENCOUNTER: ICD-10-CM

## 2023-08-29 PROCEDURE — 99203 OFFICE O/P NEW LOW 30 MIN: CPT

## 2023-08-29 RX ORDER — BACITRACIN 500 [USP'U]/G
500 OINTMENT OPHTHALMIC
Refills: 0 | Status: ACTIVE | COMMUNITY

## 2023-08-29 RX ORDER — ACETAMINOPHEN 325 MG/1
325 TABLET ORAL
Refills: 0 | Status: ACTIVE | COMMUNITY

## 2023-08-29 RX ORDER — AMOXICILLIN AND CLAVULANATE POTASSIUM 875; 125 MG/1; MG/1
875-125 TABLET, COATED ORAL
Refills: 0 | Status: ACTIVE | COMMUNITY

## 2023-08-29 RX ORDER — OXYCODONE HYDROCHLORIDE 5 MG/1
5 CAPSULE ORAL
Refills: 0 | Status: ACTIVE | COMMUNITY

## 2023-08-29 RX ORDER — LEVETIRACETAM 500 MG/1
500 TABLET, FILM COATED ORAL
Refills: 0 | Status: ACTIVE | COMMUNITY

## 2023-08-29 NOTE — REVIEW OF SYSTEMS
[As Noted in HPI] : as noted in HPI [Arthralgias] : arthralgias [Negative] : Genitourinary [de-identified] : Right facial bruising/ hematoma [FreeTextEntry9] : Right shoulder fracture; seeing orthopedics [de-identified] : Right Facial bruising
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
## 2023-08-29 NOTE — ASSESSMENT
[FreeTextEntry1] : Ms. MARCUS HUERTA is presenting s/p mechanical fall found to have small traumatic subarachnoid hemorrhage stable on multiple CT scans while in the hospital. She is doing well and is neurologically intact. Denies seizures.. The recommendation is for the following:  Plan: Follow up as needed. Take Tylenol as needed for pain. Recommend establishing care with PCP  I, Dr. Buzz Laureano, evaluated the patient with the nurse practitioner Toni Kee and established the plan of care. I discuss this patient with the nurse practitioner during the visit. I agree with the assessment and plan as written unless noted below.

## 2023-08-29 NOTE — PHYSICAL EXAM
[General Appearance - Alert] : alert [General Appearance - In No Acute Distress] : in no acute distress [Oriented To Time, Place, And Person] : oriented to person, place, and time [Impaired Insight] : insight and judgment were intact [Cranial Nerves Optic (II)] : visual acuity intact bilaterally,  pupils equal round and reactive to light [Cranial Nerves Oculomotor (III)] : extraocular motion intact [Cranial Nerves Trigeminal (V)] : facial sensation intact symmetrically [Cranial Nerves Facial (VII)] : face symmetrical [Cranial Nerves Vestibulocochlear (VIII)] : hearing was intact bilaterally [Cranial Nerves Glossopharyngeal (IX)] : tongue and palate midline [Cranial Nerves Hypoglossal (XII)] : there was no tongue deviation with protrusion [Cranial Nerves Accessory (XI - Cranial And Spinal)] : head turning and shoulder shrug symmetric [Limited Balance] : the patient's balance was impaired [Sclera] : the sclera and conjunctiva were normal [Outer Ear] : the ears and nose were normal in appearance [Neck Appearance] : the appearance of the neck was normal [] : no respiratory distress [Heart Rate And Rhythm] : heart rate was normal and rhythm regular [FreeTextEntry8] : Ambulates with cane [FreeTextEntry1] : Ambulates with cane

## 2023-08-31 ENCOUNTER — APPOINTMENT (OUTPATIENT)
Dept: OPHTHALMOLOGY | Facility: CLINIC | Age: 68
End: 2023-08-31
Payer: MEDICAID

## 2023-08-31 ENCOUNTER — NON-APPOINTMENT (OUTPATIENT)
Age: 68
End: 2023-08-31

## 2023-08-31 PROBLEM — Z78.9 OTHER SPECIFIED HEALTH STATUS: Chronic | Status: ACTIVE | Noted: 2023-08-22

## 2023-08-31 PROCEDURE — 92004 COMPRE OPH EXAM NEW PT 1/>: CPT

## 2023-09-13 ENCOUNTER — APPOINTMENT (OUTPATIENT)
Dept: ORTHOPEDIC SURGERY | Facility: HOSPITAL | Age: 68
End: 2023-09-13

## 2023-09-13 ENCOUNTER — OUTPATIENT (OUTPATIENT)
Dept: OUTPATIENT SERVICES | Facility: HOSPITAL | Age: 68
LOS: 1 days | End: 2023-09-13
Payer: SELF-PAY

## 2023-09-13 ENCOUNTER — RESULT REVIEW (OUTPATIENT)
Age: 68
End: 2023-09-13

## 2023-09-13 VITALS
HEIGHT: 60 IN | TEMPERATURE: 97.4 F | WEIGHT: 142 LBS | BODY MASS INDEX: 27.88 KG/M2 | SYSTOLIC BLOOD PRESSURE: 171 MMHG | DIASTOLIC BLOOD PRESSURE: 98 MMHG | RESPIRATION RATE: 18 BRPM | HEART RATE: 69 BPM
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
## 2023-09-13 DIAGNOSIS — S42.91XA FRACTURE OF RIGHT SHOULDER GIRDLE, PART UNSPECIFIED, INITIAL ENCOUNTER FOR CLOSED FRACTURE: ICD-10-CM

## 2023-09-13 DIAGNOSIS — M25.50 PAIN IN UNSPECIFIED JOINT: ICD-10-CM

## 2023-09-13 PROCEDURE — 73030 X-RAY EXAM OF SHOULDER: CPT

## 2023-09-13 PROCEDURE — 73030 X-RAY EXAM OF SHOULDER: CPT | Mod: 26,RT

## 2023-09-13 PROCEDURE — G0463: CPT

## 2023-09-18 ENCOUNTER — APPOINTMENT (OUTPATIENT)
Dept: PLASTIC SURGERY | Facility: CLINIC | Age: 68
End: 2023-09-18

## 2023-09-19 ENCOUNTER — APPOINTMENT (OUTPATIENT)
Age: 68
End: 2023-09-19

## 2023-10-04 ENCOUNTER — APPOINTMENT (OUTPATIENT)
Dept: ORTHOPEDIC SURGERY | Facility: HOSPITAL | Age: 68
End: 2023-10-04

## 2023-10-08 PROCEDURE — 85027 COMPLETE CBC AUTOMATED: CPT

## 2023-10-08 PROCEDURE — 97161 PT EVAL LOW COMPLEX 20 MIN: CPT

## 2023-10-08 PROCEDURE — 83735 ASSAY OF MAGNESIUM: CPT

## 2023-10-08 PROCEDURE — 80048 BASIC METABOLIC PNL TOTAL CA: CPT

## 2023-10-08 PROCEDURE — 99285 EMERGENCY DEPT VISIT HI MDM: CPT | Mod: 25

## 2023-10-08 PROCEDURE — 96365 THER/PROPH/DIAG IV INF INIT: CPT

## 2023-10-08 PROCEDURE — 84100 ASSAY OF PHOSPHORUS: CPT

## 2023-10-08 PROCEDURE — 73020 X-RAY EXAM OF SHOULDER: CPT

## 2023-10-08 PROCEDURE — 80053 COMPREHEN METABOLIC PANEL: CPT

## 2023-10-08 PROCEDURE — 86803 HEPATITIS C AB TEST: CPT

## 2023-10-08 PROCEDURE — 85025 COMPLETE CBC W/AUTO DIFF WBC: CPT

## 2023-10-08 PROCEDURE — 86901 BLOOD TYPING SEROLOGIC RH(D): CPT

## 2023-10-08 PROCEDURE — 83036 HEMOGLOBIN GLYCOSYLATED A1C: CPT

## 2023-10-08 PROCEDURE — 85610 PROTHROMBIN TIME: CPT

## 2023-10-08 PROCEDURE — 73080 X-RAY EXAM OF ELBOW: CPT

## 2023-10-08 PROCEDURE — 71045 X-RAY EXAM CHEST 1 VIEW: CPT

## 2023-10-08 PROCEDURE — 86850 RBC ANTIBODY SCREEN: CPT

## 2023-10-08 PROCEDURE — 73060 X-RAY EXAM OF HUMERUS: CPT

## 2023-10-08 PROCEDURE — 86900 BLOOD TYPING SEROLOGIC ABO: CPT

## 2023-10-08 PROCEDURE — 70450 CT HEAD/BRAIN W/O DYE: CPT | Mod: MA

## 2023-10-08 PROCEDURE — 85730 THROMBOPLASTIN TIME PARTIAL: CPT

## 2023-10-08 PROCEDURE — 96366 THER/PROPH/DIAG IV INF ADDON: CPT

## 2023-11-30 ENCOUNTER — APPOINTMENT (OUTPATIENT)
Dept: OPHTHALMOLOGY | Facility: CLINIC | Age: 68
End: 2023-11-30